# Patient Record
Sex: MALE | Race: WHITE | NOT HISPANIC OR LATINO | Employment: OTHER | URBAN - METROPOLITAN AREA
[De-identification: names, ages, dates, MRNs, and addresses within clinical notes are randomized per-mention and may not be internally consistent; named-entity substitution may affect disease eponyms.]

---

## 2019-01-01 ENCOUNTER — HOSPITAL ENCOUNTER (EMERGENCY)
Facility: HOSPITAL | Age: 84
Discharge: HOME | End: 2019-03-08
Attending: EMERGENCY MEDICINE
Payer: MEDICARE

## 2019-01-01 ENCOUNTER — HOSPITAL ENCOUNTER (INPATIENT)
Facility: HOSPITAL | Age: 84
LOS: 3 days | Discharge: NURSING, REHAB OR LONG-TERM CARE | DRG: 292 | End: 2019-03-27
Attending: FAMILY MEDICINE | Admitting: INTERNAL MEDICINE
Payer: MEDICARE

## 2019-01-01 ENCOUNTER — HOSPITAL ENCOUNTER (INPATIENT)
Facility: HOSPITAL | Age: 84
LOS: 2 days | Discharge: HOSPICE | DRG: 871 | End: 2019-06-25
Attending: INTERNAL MEDICINE | Admitting: INTERNAL MEDICINE
Payer: MEDICARE

## 2019-01-01 ENCOUNTER — HOSPITAL ENCOUNTER (INPATIENT)
Facility: HOSPITAL | Age: 84
LOS: 1 days | DRG: 951 | End: 2019-06-26
Attending: INTERNAL MEDICINE | Admitting: INTERNAL MEDICINE
Payer: COMMERCIAL

## 2019-01-01 VITALS
OXYGEN SATURATION: 76 % | HEART RATE: 70 BPM | TEMPERATURE: 95 F | WEIGHT: 115.75 LBS | HEIGHT: 72 IN | SYSTOLIC BLOOD PRESSURE: 63 MMHG | BODY MASS INDEX: 15.68 KG/M2 | RESPIRATION RATE: 7 BRPM | DIASTOLIC BLOOD PRESSURE: 46 MMHG

## 2019-01-01 VITALS
DIASTOLIC BLOOD PRESSURE: 65 MMHG | WEIGHT: 141.31 LBS | HEART RATE: 78 BPM | RESPIRATION RATE: 19 BRPM | BODY MASS INDEX: 19.78 KG/M2 | TEMPERATURE: 97 F | SYSTOLIC BLOOD PRESSURE: 113 MMHG | HEIGHT: 71 IN | OXYGEN SATURATION: 98 %

## 2019-01-01 VITALS
WEIGHT: 180 LBS | OXYGEN SATURATION: 99 % | RESPIRATION RATE: 20 BRPM | DIASTOLIC BLOOD PRESSURE: 68 MMHG | SYSTOLIC BLOOD PRESSURE: 118 MMHG | TEMPERATURE: 98 F | HEART RATE: 70 BPM

## 2019-01-01 VITALS
WEIGHT: 115.06 LBS | SYSTOLIC BLOOD PRESSURE: 97 MMHG | OXYGEN SATURATION: 100 % | HEIGHT: 72 IN | DIASTOLIC BLOOD PRESSURE: 60 MMHG | HEART RATE: 80 BPM | RESPIRATION RATE: 16 BRPM | TEMPERATURE: 98 F | BODY MASS INDEX: 15.58 KG/M2

## 2019-01-01 DIAGNOSIS — Z51.5 HOSPICE CARE: ICD-10-CM

## 2019-01-01 DIAGNOSIS — R09.89 RESPIRATORY COMPROMISE: ICD-10-CM

## 2019-01-01 DIAGNOSIS — J90 LOCULATED PLEURAL EFFUSION: ICD-10-CM

## 2019-01-01 DIAGNOSIS — E43 SEVERE MALNUTRITION: ICD-10-CM

## 2019-01-01 DIAGNOSIS — Z79.01 CHRONIC ANTICOAGULATION: ICD-10-CM

## 2019-01-01 DIAGNOSIS — I48.20 CHRONIC A-FIB: ICD-10-CM

## 2019-01-01 DIAGNOSIS — I50.9 CHF (CONGESTIVE HEART FAILURE): ICD-10-CM

## 2019-01-01 DIAGNOSIS — E86.0 MILD DEHYDRATION: Primary | ICD-10-CM

## 2019-01-01 DIAGNOSIS — I50.21 ACUTE SYSTOLIC CONGESTIVE HEART FAILURE: ICD-10-CM

## 2019-01-01 DIAGNOSIS — A41.9 SEPSIS: ICD-10-CM

## 2019-01-01 DIAGNOSIS — S09.8XXA BLUNT HEAD TRAUMA, INITIAL ENCOUNTER: Primary | ICD-10-CM

## 2019-01-01 DIAGNOSIS — S51.811A SKIN TEAR OF RIGHT FOREARM WITHOUT COMPLICATION, INITIAL ENCOUNTER: ICD-10-CM

## 2019-01-01 DIAGNOSIS — R64 CACHEXIA: ICD-10-CM

## 2019-01-01 DIAGNOSIS — J18.9 PNEUMONIA OF LEFT LUNG DUE TO INFECTIOUS ORGANISM, UNSPECIFIED PART OF LUNG: ICD-10-CM

## 2019-01-01 DIAGNOSIS — I95.0 IDIOPATHIC HYPOTENSION: Primary | ICD-10-CM

## 2019-01-01 DIAGNOSIS — R53.1 WEAKNESS: ICD-10-CM

## 2019-01-01 DIAGNOSIS — J90 PLEURAL EFFUSION: ICD-10-CM

## 2019-01-01 DIAGNOSIS — I50.9 ACUTE CONGESTIVE HEART FAILURE: ICD-10-CM

## 2019-01-01 LAB
ALBUMIN SERPL BCP-MCNC: 2.4 G/DL
ALBUMIN SERPL BCP-MCNC: 2.4 G/DL (ref 3.5–5.2)
ALBUMIN SERPL BCP-MCNC: 2.5 G/DL (ref 3.5–5.2)
ALBUMIN SERPL BCP-MCNC: 2.7 G/DL
ALBUMIN SERPL BCP-MCNC: 2.7 G/DL (ref 3.5–5.2)
ALLENS TEST: ABNORMAL
ALP SERPL-CCNC: 74 U/L (ref 55–135)
ALP SERPL-CCNC: 80 U/L (ref 55–135)
ALP SERPL-CCNC: 89 U/L
ALP SERPL-CCNC: 89 U/L (ref 55–135)
ALP SERPL-CCNC: 90 U/L
ALT SERPL W/O P-5'-P-CCNC: 22 U/L (ref 10–44)
ALT SERPL W/O P-5'-P-CCNC: 53 U/L (ref 10–44)
ALT SERPL W/O P-5'-P-CCNC: 8 U/L
ALT SERPL W/O P-5'-P-CCNC: 8 U/L
ALT SERPL W/O P-5'-P-CCNC: 9 U/L (ref 10–44)
AMORPH CRY URNS QL MICRO: ABNORMAL
ANION GAP SERPL CALC-SCNC: 10 MMOL/L (ref 8–16)
ANION GAP SERPL CALC-SCNC: 10 MMOL/L (ref 8–16)
ANION GAP SERPL CALC-SCNC: 11 MMOL/L (ref 8–16)
ANION GAP SERPL CALC-SCNC: 12 MMOL/L (ref 8–16)
ANION GAP SERPL CALC-SCNC: 14 MMOL/L (ref 8–16)
ANION GAP SERPL CALC-SCNC: 6 MMOL/L
ANION GAP SERPL CALC-SCNC: 6 MMOL/L (ref 8–16)
ANION GAP SERPL CALC-SCNC: 7 MMOL/L
ANION GAP SERPL CALC-SCNC: 7 MMOL/L (ref 8–16)
ANION GAP SERPL CALC-SCNC: 9 MMOL/L
ANION GAP SERPL CALC-SCNC: 9 MMOL/L (ref 8–16)
ANISOCYTOSIS BLD QL SMEAR: ABNORMAL
AORTIC VALVE CUSP SEPERATION: 2 CM
AORTIC VALVE CUSP SEPERATION: 2 CM
AST SERPL-CCNC: 153 U/L (ref 10–40)
AST SERPL-CCNC: 16 U/L (ref 10–40)
AST SERPL-CCNC: 17 U/L
AST SERPL-CCNC: 18 U/L
AST SERPL-CCNC: 42 U/L (ref 10–40)
AV PEAK GRADIENT: 2.56 MMHG
AV PEAK GRADIENT: 6 MMHG
AV VELOCITY RATIO: 0.63
AV VELOCITY RATIO: 0.67
BACTERIA #/AREA URNS HPF: ABNORMAL /HPF
BACTERIA #/AREA URNS HPF: ABNORMAL /HPF
BACTERIA UR CULT: NORMAL
BASOPHILS # BLD AUTO: 0 K/UL (ref 0–0.2)
BASOPHILS # BLD AUTO: 0.01 K/UL (ref 0–0.2)
BASOPHILS # BLD AUTO: 0.02 K/UL (ref 0–0.2)
BASOPHILS # BLD AUTO: 0.03 K/UL (ref 0–0.2)
BASOPHILS # BLD AUTO: 0.03 K/UL (ref 0–0.2)
BASOPHILS # BLD AUTO: 0.04 K/UL
BASOPHILS # BLD AUTO: 0.04 K/UL
BASOPHILS # BLD AUTO: 0.04 K/UL (ref 0–0.2)
BASOPHILS # BLD AUTO: 0.05 K/UL
BASOPHILS # BLD AUTO: 0.05 K/UL (ref 0–0.2)
BASOPHILS # BLD AUTO: 0.06 K/UL (ref 0–0.2)
BASOPHILS NFR BLD: 0 % (ref 0–1.9)
BASOPHILS NFR BLD: 0.1 % (ref 0–1.9)
BASOPHILS NFR BLD: 0.2 % (ref 0–1.9)
BASOPHILS NFR BLD: 0.2 % (ref 0–1.9)
BASOPHILS NFR BLD: 0.4 % (ref 0–1.9)
BASOPHILS NFR BLD: 0.5 %
BASOPHILS NFR BLD: 0.5 % (ref 0–1.9)
BASOPHILS NFR BLD: 0.6 %
BASOPHILS NFR BLD: 0.7 %
BASOPHILS NFR BLD: 0.7 % (ref 0–1.9)
BASOPHILS NFR BLD: 0.8 % (ref 0–1.9)
BILIRUB SERPL-MCNC: 1.1 MG/DL
BILIRUB SERPL-MCNC: 1.1 MG/DL (ref 0.1–1)
BILIRUB SERPL-MCNC: 1.4 MG/DL (ref 0.1–1)
BILIRUB SERPL-MCNC: 1.6 MG/DL (ref 0.1–1)
BILIRUB SERPL-MCNC: 2 MG/DL
BILIRUB UR QL STRIP: ABNORMAL
BILIRUB UR QL STRIP: ABNORMAL
BNP SERPL-MCNC: 1043 PG/ML
BNP SERPL-MCNC: 1228 PG/ML (ref 0–99)
BNP SERPL-MCNC: 1236 PG/ML (ref 0–99)
BNP SERPL-MCNC: 1369 PG/ML (ref 0–99)
BNP SERPL-MCNC: 834 PG/ML (ref 0–99)
BSA FOR ECHO PROCEDURE: 1.83 M2
BUN SERPL-MCNC: 19 MG/DL
BUN SERPL-MCNC: 19 MG/DL (ref 8–23)
BUN SERPL-MCNC: 19 MG/DL (ref 8–23)
BUN SERPL-MCNC: 22 MG/DL
BUN SERPL-MCNC: 22 MG/DL
BUN SERPL-MCNC: 23 MG/DL (ref 8–23)
BUN SERPL-MCNC: 23 MG/DL (ref 8–23)
BUN SERPL-MCNC: 45 MG/DL (ref 8–23)
BUN SERPL-MCNC: 52 MG/DL (ref 8–23)
BUN SERPL-MCNC: 62 MG/DL (ref 8–23)
BUN SERPL-MCNC: 63 MG/DL (ref 8–23)
BURR CELLS BLD QL SMEAR: ABNORMAL
CALCIUM SERPL-MCNC: 7.8 MG/DL (ref 8.7–10.5)
CALCIUM SERPL-MCNC: 7.8 MG/DL (ref 8.7–10.5)
CALCIUM SERPL-MCNC: 8 MG/DL (ref 8.7–10.5)
CALCIUM SERPL-MCNC: 8.1 MG/DL
CALCIUM SERPL-MCNC: 8.2 MG/DL
CALCIUM SERPL-MCNC: 8.3 MG/DL (ref 8.7–10.5)
CALCIUM SERPL-MCNC: 8.3 MG/DL (ref 8.7–10.5)
CALCIUM SERPL-MCNC: 8.4 MG/DL (ref 8.7–10.5)
CALCIUM SERPL-MCNC: 8.4 MG/DL (ref 8.7–10.5)
CALCIUM SERPL-MCNC: 8.5 MG/DL
CALCIUM SERPL-MCNC: 9.4 MG/DL (ref 8.7–10.5)
CHLORIDE SERPL-SCNC: 101 MMOL/L
CHLORIDE SERPL-SCNC: 102 MMOL/L
CHLORIDE SERPL-SCNC: 105 MMOL/L (ref 95–110)
CHLORIDE SERPL-SCNC: 106 MMOL/L (ref 95–110)
CHLORIDE SERPL-SCNC: 107 MMOL/L (ref 95–110)
CHLORIDE SERPL-SCNC: 107 MMOL/L (ref 95–110)
CHLORIDE SERPL-SCNC: 93 MMOL/L (ref 95–110)
CHLORIDE SERPL-SCNC: 94 MMOL/L (ref 95–110)
CHLORIDE SERPL-SCNC: 95 MMOL/L
CHLORIDE SERPL-SCNC: 95 MMOL/L (ref 95–110)
CHLORIDE SERPL-SCNC: 97 MMOL/L (ref 95–110)
CLARITY UR: ABNORMAL
CLARITY UR: CLEAR
CO2 SERPL-SCNC: 18 MMOL/L (ref 23–29)
CO2 SERPL-SCNC: 21 MMOL/L (ref 23–29)
CO2 SERPL-SCNC: 21 MMOL/L (ref 23–29)
CO2 SERPL-SCNC: 22 MMOL/L (ref 23–29)
CO2 SERPL-SCNC: 29 MMOL/L
CO2 SERPL-SCNC: 29 MMOL/L (ref 23–29)
CO2 SERPL-SCNC: 30 MMOL/L (ref 23–29)
COLOR UR: YELLOW
COLOR UR: YELLOW
CPAPPEEP: 5
CPAPPEEP: ABNORMAL
CREAT SERPL-MCNC: 0.8 MG/DL
CREAT SERPL-MCNC: 0.8 MG/DL
CREAT SERPL-MCNC: 0.8 MG/DL (ref 0.5–1.4)
CREAT SERPL-MCNC: 0.9 MG/DL
CREAT SERPL-MCNC: 0.9 MG/DL (ref 0.5–1.4)
CREAT SERPL-MCNC: 1 MG/DL (ref 0.5–1.4)
CREAT SERPL-MCNC: 1 MG/DL (ref 0.5–1.4)
CREAT SERPL-MCNC: 1.9 MG/DL (ref 0.5–1.4)
CREAT SERPL-MCNC: 2.3 MG/DL (ref 0.5–1.4)
CREAT SERPL-MCNC: 2.6 MG/DL (ref 0.5–1.4)
CREAT SERPL-MCNC: 2.7 MG/DL (ref 0.5–1.4)
CV ECHO LV RWT: 0.55 CM
CV ECHO LV RWT: 0.64 CM
DIFFERENTIAL METHOD: ABNORMAL
DOP CALC AO PEAK VEL: 0.8 M/S
DOP CALC AO PEAK VEL: 1.2 M/S
DOP CALC LVOT AREA: 3.14 CM2
DOP CALC LVOT AREA: 3.5 CM2
DOP CALC LVOT DIAMETER: 2 CM
DOP CALC LVOT DIAMETER: 2.1 CM
DOP CALC LVOT PEAK VEL: 0.5 M/S
DOP CALC LVOT PEAK VEL: 0.8 M/S
E/A RATIO: 3.11
E/A RATIO: 3.48
ECHO LV POSTERIOR WALL: 1.2 CM (ref 0.6–1.1)
ECHO LV POSTERIOR WALL: 1.5 CM (ref 0.6–1.1)
EOSINOPHIL # BLD AUTO: 0 K/UL (ref 0–0.5)
EOSINOPHIL # BLD AUTO: 0.1 K/UL (ref 0–0.5)
EOSINOPHIL # BLD AUTO: 0.2 K/UL (ref 0–0.5)
EOSINOPHIL # BLD AUTO: 0.3 K/UL
EOSINOPHIL # BLD AUTO: 0.3 K/UL (ref 0–0.5)
EOSINOPHIL # BLD AUTO: 0.4 K/UL (ref 0–0.5)
EOSINOPHIL NFR BLD: 0 % (ref 0–8)
EOSINOPHIL NFR BLD: 0.1 % (ref 0–8)
EOSINOPHIL NFR BLD: 1.9 % (ref 0–8)
EOSINOPHIL NFR BLD: 3.5 % (ref 0–8)
EOSINOPHIL NFR BLD: 3.9 %
EOSINOPHIL NFR BLD: 3.9 % (ref 0–8)
EOSINOPHIL NFR BLD: 4 %
EOSINOPHIL NFR BLD: 4.2 %
EOSINOPHIL NFR BLD: 4.5 % (ref 0–8)
ERYTHROCYTE [DISTWIDTH] IN BLOOD BY AUTOMATED COUNT: 19.3 % (ref 11.5–14.5)
ERYTHROCYTE [DISTWIDTH] IN BLOOD BY AUTOMATED COUNT: 19.4 %
ERYTHROCYTE [DISTWIDTH] IN BLOOD BY AUTOMATED COUNT: 19.4 % (ref 11.5–14.5)
ERYTHROCYTE [DISTWIDTH] IN BLOOD BY AUTOMATED COUNT: 19.6 %
ERYTHROCYTE [DISTWIDTH] IN BLOOD BY AUTOMATED COUNT: 19.8 %
ERYTHROCYTE [DISTWIDTH] IN BLOOD BY AUTOMATED COUNT: 19.9 % (ref 11.5–14.5)
ERYTHROCYTE [DISTWIDTH] IN BLOOD BY AUTOMATED COUNT: 20 % (ref 11.5–14.5)
ERYTHROCYTE [DISTWIDTH] IN BLOOD BY AUTOMATED COUNT: 20.9 % (ref 11.5–14.5)
ERYTHROCYTE [DISTWIDTH] IN BLOOD BY AUTOMATED COUNT: 21.1 % (ref 11.5–14.5)
ERYTHROCYTE [DISTWIDTH] IN BLOOD BY AUTOMATED COUNT: 21.3 % (ref 11.5–14.5)
ERYTHROCYTE [DISTWIDTH] IN BLOOD BY AUTOMATED COUNT: 21.5 % (ref 11.5–14.5)
ERYTHROCYTE [SEDIMENTATION RATE] IN BLOOD BY WESTERGREN METHOD: 12 MM/H
ERYTHROCYTE [SEDIMENTATION RATE] IN BLOOD BY WESTERGREN METHOD: 12 MM/H
ERYTHROCYTE [SEDIMENTATION RATE] IN BLOOD BY WESTERGREN METHOD: 16 MM/H
ERYTHROCYTE [SEDIMENTATION RATE] IN BLOOD BY WESTERGREN METHOD: 16 MM/H
EST. GFR  (AFRICAN AMERICAN): 23.6 ML/MIN/1.73 M^2
EST. GFR  (AFRICAN AMERICAN): 24.7 ML/MIN/1.73 M^2
EST. GFR  (AFRICAN AMERICAN): 28.7 ML/MIN/1.73 M^2
EST. GFR  (AFRICAN AMERICAN): 36.1 ML/MIN/1.73 M^2
EST. GFR  (AFRICAN AMERICAN): >60 ML/MIN/1.73 M^2
EST. GFR  (NON AFRICAN AMERICAN): 20.4 ML/MIN/1.73 M^2
EST. GFR  (NON AFRICAN AMERICAN): 21.4 ML/MIN/1.73 M^2
EST. GFR  (NON AFRICAN AMERICAN): 24.8 ML/MIN/1.73 M^2
EST. GFR  (NON AFRICAN AMERICAN): 31.2 ML/MIN/1.73 M^2
EST. GFR  (NON AFRICAN AMERICAN): >60 ML/MIN/1.73 M^2
FRACTIONAL SHORTENING: 17 % (ref 28–44)
FRACTIONAL SHORTENING: 18 % (ref 28–44)
GLUCOSE SERPL-MCNC: 106 MG/DL (ref 70–110)
GLUCOSE SERPL-MCNC: 113 MG/DL
GLUCOSE SERPL-MCNC: 146 MG/DL (ref 70–110)
GLUCOSE SERPL-MCNC: 163 MG/DL
GLUCOSE SERPL-MCNC: 188 MG/DL (ref 70–110)
GLUCOSE SERPL-MCNC: 65 MG/DL (ref 70–110)
GLUCOSE SERPL-MCNC: 87 MG/DL
GLUCOSE SERPL-MCNC: 90 MG/DL (ref 70–110)
GLUCOSE SERPL-MCNC: 90 MG/DL (ref 70–110)
GLUCOSE SERPL-MCNC: 94 MG/DL (ref 70–110)
GLUCOSE SERPL-MCNC: 95 MG/DL (ref 70–110)
GLUCOSE UR QL STRIP: NEGATIVE
GLUCOSE UR QL STRIP: NEGATIVE
HCO3 UR-SCNC: 17.4 MMOL/L (ref 22–26)
HCO3 UR-SCNC: 19 MMOL/L (ref 22–26)
HCO3 UR-SCNC: 19.3 MMOL/L (ref 22–26)
HCO3 UR-SCNC: 19.4 MMOL/L (ref 22–26)
HCT VFR BLD AUTO: 36.1 % (ref 40–54)
HCT VFR BLD AUTO: 37.6 % (ref 40–54)
HCT VFR BLD AUTO: 38.1 % (ref 40–54)
HCT VFR BLD AUTO: 38.3 % (ref 40–54)
HCT VFR BLD AUTO: 38.4 % (ref 40–54)
HCT VFR BLD AUTO: 38.7 %
HCT VFR BLD AUTO: 38.8 % (ref 40–54)
HCT VFR BLD AUTO: 39.5 %
HCT VFR BLD AUTO: 39.8 % (ref 40–54)
HCT VFR BLD AUTO: 41.9 % (ref 40–54)
HCT VFR BLD AUTO: 44.6 %
HGB BLD-MCNC: 11.3 G/DL (ref 14–18)
HGB BLD-MCNC: 11.8 G/DL (ref 14–18)
HGB BLD-MCNC: 11.8 G/DL (ref 14–18)
HGB BLD-MCNC: 11.9 G/DL (ref 14–18)
HGB BLD-MCNC: 12 G/DL
HGB BLD-MCNC: 12.1 G/DL
HGB BLD-MCNC: 12.1 G/DL (ref 14–18)
HGB BLD-MCNC: 12.1 G/DL (ref 14–18)
HGB BLD-MCNC: 12.2 G/DL (ref 14–18)
HGB BLD-MCNC: 13.1 G/DL (ref 14–18)
HGB BLD-MCNC: 13.8 G/DL
HGB UR QL STRIP: NEGATIVE
HGB UR QL STRIP: NEGATIVE
HYALINE CASTS #/AREA URNS LPF: 2 /LPF
HYALINE CASTS #/AREA URNS LPF: 75 /LPF
IMM GRANULOCYTES # BLD AUTO: 0.02 K/UL
IMM GRANULOCYTES # BLD AUTO: 0.02 K/UL
IMM GRANULOCYTES # BLD AUTO: 0.02 K/UL (ref 0–0.04)
IMM GRANULOCYTES # BLD AUTO: 0.03 K/UL
IMM GRANULOCYTES # BLD AUTO: 0.05 K/UL (ref 0–0.04)
IMM GRANULOCYTES # BLD AUTO: 0.08 K/UL (ref 0–0.04)
IMM GRANULOCYTES # BLD AUTO: 0.09 K/UL (ref 0–0.04)
IMM GRANULOCYTES # BLD AUTO: 0.1 K/UL (ref 0–0.04)
IMM GRANULOCYTES NFR BLD AUTO: 0.2 % (ref 0–0.5)
IMM GRANULOCYTES NFR BLD AUTO: 0.3 %
IMM GRANULOCYTES NFR BLD AUTO: 0.3 %
IMM GRANULOCYTES NFR BLD AUTO: 0.3 % (ref 0–0.5)
IMM GRANULOCYTES NFR BLD AUTO: 0.5 %
IMM GRANULOCYTES NFR BLD AUTO: 0.6 % (ref 0–0.5)
IMM GRANULOCYTES NFR BLD AUTO: 0.7 % (ref 0–0.5)
IMM GRANULOCYTES NFR BLD AUTO: 0.7 % (ref 0–0.5)
IMM GRANULOCYTES NFR BLD AUTO: 0.8 % (ref 0–0.5)
INTERVENTRICULAR SEPTUM: 1.9 CM (ref 0.6–1.1)
INTERVENTRICULAR SEPTUM: 2 CM (ref 0.6–1.1)
KETONES UR QL STRIP: NEGATIVE
KETONES UR QL STRIP: NEGATIVE
LACTATE SERPL-SCNC: 2.3 MMOL/L (ref 0.5–2.2)
LEFT ATRIUM SIZE: 4.1 CM
LEFT ATRIUM SIZE: 4.8 CM
LEFT INTERNAL DIMENSION IN SYSTOLE: 3.6 CM (ref 2.1–4)
LEFT INTERNAL DIMENSION IN SYSTOLE: 3.9 CM (ref 2.1–4)
LEFT VENTRICLE MASS INDEX: 167 G/M2
LEFT VENTRICLE MASS INDEX: 201 G/M2
LEFT VENTRICULAR INTERNAL DIMENSION IN DIASTOLE: 4.4 CM (ref 3.5–6)
LEFT VENTRICULAR INTERNAL DIMENSION IN DIASTOLE: 4.7 CM (ref 3.5–6)
LEFT VENTRICULAR MASS: 280.73 G
LEFT VENTRICULAR MASS: 372.96 G
LEUKOCYTE ESTERASE UR QL STRIP: ABNORMAL
LEUKOCYTE ESTERASE UR QL STRIP: NEGATIVE
LYMPHOCYTES # BLD AUTO: 0.5 K/UL (ref 1–4.8)
LYMPHOCYTES # BLD AUTO: 0.7 K/UL (ref 1–4.8)
LYMPHOCYTES # BLD AUTO: 1.1 K/UL
LYMPHOCYTES # BLD AUTO: 1.2 K/UL
LYMPHOCYTES # BLD AUTO: 1.4 K/UL (ref 1–4.8)
LYMPHOCYTES # BLD AUTO: 1.4 K/UL (ref 1–4.8)
LYMPHOCYTES # BLD AUTO: 1.5 K/UL (ref 1–4.8)
LYMPHOCYTES # BLD AUTO: 1.6 K/UL (ref 1–4.8)
LYMPHOCYTES # BLD AUTO: 1.7 K/UL
LYMPHOCYTES NFR BLD: 17 %
LYMPHOCYTES NFR BLD: 17.1 %
LYMPHOCYTES NFR BLD: 17.2 % (ref 18–48)
LYMPHOCYTES NFR BLD: 20 % (ref 18–48)
LYMPHOCYTES NFR BLD: 20.1 % (ref 18–48)
LYMPHOCYTES NFR BLD: 20.4 % (ref 18–48)
LYMPHOCYTES NFR BLD: 20.8 %
LYMPHOCYTES NFR BLD: 3.9 % (ref 18–48)
LYMPHOCYTES NFR BLD: 5.5 % (ref 18–48)
LYMPHOCYTES NFR BLD: 6 % (ref 18–48)
LYMPHOCYTES NFR BLD: 8.2 % (ref 18–48)
MAGNESIUM SERPL-MCNC: 1.9 MG/DL
MAGNESIUM SERPL-MCNC: 1.9 MG/DL (ref 1.6–2.6)
MAGNESIUM SERPL-MCNC: 2 MG/DL (ref 1.6–2.6)
MCH RBC QN AUTO: 24.3 PG (ref 27–31)
MCH RBC QN AUTO: 24.5 PG (ref 27–31)
MCH RBC QN AUTO: 24.6 PG
MCH RBC QN AUTO: 24.6 PG (ref 27–31)
MCH RBC QN AUTO: 24.7 PG
MCH RBC QN AUTO: 24.7 PG
MCH RBC QN AUTO: 24.7 PG (ref 27–31)
MCH RBC QN AUTO: 25.6 PG (ref 27–31)
MCH RBC QN AUTO: 25.6 PG (ref 27–31)
MCH RBC QN AUTO: 25.7 PG (ref 27–31)
MCH RBC QN AUTO: 26 PG (ref 27–31)
MCHC RBC AUTO-ENTMCNC: 30.6 G/DL
MCHC RBC AUTO-ENTMCNC: 30.7 G/DL (ref 32–36)
MCHC RBC AUTO-ENTMCNC: 30.8 G/DL (ref 32–36)
MCHC RBC AUTO-ENTMCNC: 30.9 G/DL
MCHC RBC AUTO-ENTMCNC: 31 G/DL
MCHC RBC AUTO-ENTMCNC: 31.2 G/DL (ref 32–36)
MCHC RBC AUTO-ENTMCNC: 31.2 G/DL (ref 32–36)
MCHC RBC AUTO-ENTMCNC: 31.3 G/DL (ref 32–36)
MCHC RBC AUTO-ENTMCNC: 31.3 G/DL (ref 32–36)
MCHC RBC AUTO-ENTMCNC: 31.4 G/DL (ref 32–36)
MCHC RBC AUTO-ENTMCNC: 31.5 G/DL (ref 32–36)
MCV RBC AUTO: 78 FL (ref 82–98)
MCV RBC AUTO: 79 FL
MCV RBC AUTO: 79 FL (ref 82–98)
MCV RBC AUTO: 80 FL
MCV RBC AUTO: 81 FL
MCV RBC AUTO: 82 FL (ref 82–98)
MCV RBC AUTO: 83 FL (ref 82–98)
MICROSCOPIC COMMENT: ABNORMAL
MICROSCOPIC COMMENT: ABNORMAL
MODE: ABNORMAL
MONOCYTES # BLD AUTO: 0.4 K/UL (ref 0.3–1)
MONOCYTES # BLD AUTO: 0.4 K/UL (ref 0.3–1)
MONOCYTES # BLD AUTO: 0.5 K/UL (ref 0.3–1)
MONOCYTES # BLD AUTO: 0.6 K/UL (ref 0.3–1)
MONOCYTES # BLD AUTO: 0.8 K/UL
MONOCYTES # BLD AUTO: 0.8 K/UL
MONOCYTES # BLD AUTO: 0.8 K/UL (ref 0.3–1)
MONOCYTES # BLD AUTO: 0.9 K/UL (ref 0.3–1)
MONOCYTES # BLD AUTO: 1 K/UL
MONOCYTES # BLD AUTO: 1 K/UL (ref 0.3–1)
MONOCYTES # BLD AUTO: 1 K/UL (ref 0.3–1)
MONOCYTES NFR BLD: 10.3 %
MONOCYTES NFR BLD: 12 % (ref 4–15)
MONOCYTES NFR BLD: 12 % (ref 4–15)
MONOCYTES NFR BLD: 12.2 % (ref 4–15)
MONOCYTES NFR BLD: 12.3 %
MONOCYTES NFR BLD: 12.9 %
MONOCYTES NFR BLD: 13 % (ref 4–15)
MONOCYTES NFR BLD: 3.2 % (ref 4–15)
MONOCYTES NFR BLD: 3.3 % (ref 4–15)
MONOCYTES NFR BLD: 4 % (ref 4–15)
MONOCYTES NFR BLD: 6.6 % (ref 4–15)
MV PEAK A VEL: 0.23 M/S
MV PEAK A VEL: 0.28 M/S
MV PEAK E VEL: 0.8 M/S
MV PEAK E VEL: 0.87 M/S
MV PEAK GRADIENT: 53 MMHG
MV PEAK GRADIENT: 82 MMHG
MV STENOSIS PRESSURE HALF TIME: 40 MS
MV STENOSIS PRESSURE HALF TIME: 57 MS
MV VALVE AREA P 1/2 METHOD: 3.86 CM2
MV VALVE AREA P 1/2 METHOD: 5.5 CM2
NEUTROPHILS # BLD AUTO: 10.4 K/UL (ref 1.8–7.7)
NEUTROPHILS # BLD AUTO: 10.9 K/UL (ref 1.8–7.7)
NEUTROPHILS # BLD AUTO: 11.9 K/UL (ref 1.8–7.7)
NEUTROPHILS # BLD AUTO: 4.3 K/UL
NEUTROPHILS # BLD AUTO: 4.4 K/UL (ref 1.8–7.7)
NEUTROPHILS # BLD AUTO: 4.9 K/UL
NEUTROPHILS # BLD AUTO: 4.9 K/UL
NEUTROPHILS # BLD AUTO: 4.9 K/UL (ref 1.8–7.7)
NEUTROPHILS # BLD AUTO: 4.9 K/UL (ref 1.8–7.7)
NEUTROPHILS # BLD AUTO: 5.3 K/UL (ref 1.8–7.7)
NEUTROPHILS # BLD AUTO: 7.2 K/UL (ref 1.8–7.7)
NEUTROPHILS NFR BLD: 61.3 %
NEUTROPHILS NFR BLD: 61.6 % (ref 38–73)
NEUTROPHILS NFR BLD: 63.7 % (ref 38–73)
NEUTROPHILS NFR BLD: 64.9 % (ref 38–73)
NEUTROPHILS NFR BLD: 65.5 %
NEUTROPHILS NFR BLD: 65.6 % (ref 38–73)
NEUTROPHILS NFR BLD: 67.8 %
NEUTROPHILS NFR BLD: 84.3 % (ref 38–73)
NEUTROPHILS NFR BLD: 89.9 % (ref 38–73)
NEUTROPHILS NFR BLD: 90.3 % (ref 38–73)
NEUTROPHILS NFR BLD: 91.4 % (ref 38–73)
NITRITE UR QL STRIP: NEGATIVE
NITRITE UR QL STRIP: NEGATIVE
NRBC BLD-RTO: 0 /100 WBC
NRBC BLD-RTO: 1 /100 WBC
O2DEVICE: ABNORMAL
PCO2 BLDA: 34.5 MMHG (ref 35–45)
PCO2 BLDA: 38.4 MMHG (ref 35–45)
PCO2 BLDA: 39.5 MMHG (ref 35–45)
PCO2 BLDA: 42.2 MMHG (ref 35–45)
PH SMN: 7.28 [PH] (ref 7.35–7.45)
PH SMN: 7.3 [PH] (ref 7.35–7.45)
PH SMN: 7.32 [PH] (ref 7.35–7.45)
PH SMN: 7.32 [PH] (ref 7.35–7.45)
PH UR STRIP: 5 [PH] (ref 5–8)
PH UR STRIP: 5 [PH] (ref 5–8)
PHOSPHATE SERPL-MCNC: 4.6 MG/DL (ref 2.7–4.5)
PIP: ABNORMAL
PISA TR MAX VEL: 1.94 M/S
PISA TR MAX VEL: 2.65 M/S
PLATELET # BLD AUTO: 236 K/UL (ref 150–350)
PLATELET # BLD AUTO: 239 K/UL (ref 150–350)
PLATELET # BLD AUTO: 241 K/UL (ref 150–350)
PLATELET # BLD AUTO: 256 K/UL (ref 150–350)
PLATELET # BLD AUTO: 257 K/UL
PLATELET # BLD AUTO: 257 K/UL (ref 150–350)
PLATELET # BLD AUTO: 263 K/UL
PLATELET # BLD AUTO: 264 K/UL
PLATELET # BLD AUTO: 264 K/UL (ref 150–350)
PLATELET # BLD AUTO: 271 K/UL (ref 150–350)
PLATELET # BLD AUTO: 279 K/UL (ref 150–350)
PMV BLD AUTO: 10 FL
PMV BLD AUTO: 10 FL (ref 9.2–12.9)
PMV BLD AUTO: 9.3 FL
PMV BLD AUTO: 9.3 FL (ref 9.2–12.9)
PMV BLD AUTO: 9.4 FL (ref 9.2–12.9)
PMV BLD AUTO: 9.8 FL
PMV BLD AUTO: 9.8 FL (ref 9.2–12.9)
PMV BLD AUTO: 9.9 FL (ref 9.2–12.9)
PO2 BLDA: 125.6 MMHG (ref 75–100)
PO2 BLDA: 126.9 MMHG (ref 75–100)
PO2 BLDA: 386.5 MMHG (ref 75–100)
PO2 BLDA: 88 MMHG (ref 75–100)
POC BE: -6.3 MMOL/L (ref -2–2)
POC BE: -6.8 MMOL/L (ref -2–2)
POC BE: -7 MMOL/L (ref -2–2)
POC BE: -7.7 MMOL/L (ref -2–2)
POC FIO2: ABNORMAL
POC FLOW: ABNORMAL
POC O2 PERCENT: 50 %
POC O2 PERCENT: 50 %
POC O2 PERCENT: ABNORMAL %
POC O2 PERCENT: ABNORMAL %
POC SATURATED O2: 100 % (ref 90–100)
POC SATURATED O2: 95.9 % (ref 90–100)
POC SATURATED O2: 98.4 % (ref 90–100)
POC SATURATED O2: 98.4 % (ref 90–100)
POC TCO2: 18.5 MMOL/L (ref 22–28)
POC TCO2: 20.3 MMOL/L (ref 22–28)
POC TCO2: 20.4 MMOL/L (ref 22–28)
POC TCO2: 20.7 MMOL/L (ref 22–28)
POC TEMPERATURE: ABNORMAL C
POIKILOCYTOSIS BLD QL SMEAR: ABNORMAL
POLYCHROMASIA BLD QL SMEAR: ABNORMAL
POTASSIUM SERPL-SCNC: 3.4 MMOL/L
POTASSIUM SERPL-SCNC: 3.5 MMOL/L
POTASSIUM SERPL-SCNC: 3.7 MMOL/L
POTASSIUM SERPL-SCNC: 3.7 MMOL/L (ref 3.5–5.1)
POTASSIUM SERPL-SCNC: 3.8 MMOL/L (ref 3.5–5.1)
POTASSIUM SERPL-SCNC: 4 MMOL/L (ref 3.5–5.1)
POTASSIUM SERPL-SCNC: 4.1 MMOL/L (ref 3.5–5.1)
POTASSIUM SERPL-SCNC: 4.3 MMOL/L (ref 3.5–5.1)
POTASSIUM SERPL-SCNC: 4.3 MMOL/L (ref 3.5–5.1)
POTASSIUM SERPL-SCNC: 4.4 MMOL/L (ref 3.5–5.1)
POTASSIUM SERPL-SCNC: 4.6 MMOL/L (ref 3.5–5.1)
PRESSURE SUPPORT: 0
PRESSURE SUPPORT: 0
PRESSURE SUPPORT: ABNORMAL
PRESSURE SUPPORT: ABNORMAL
PROT SERPL-MCNC: 5.8 G/DL (ref 6–8.4)
PROT SERPL-MCNC: 5.9 G/DL (ref 6–8.4)
PROT SERPL-MCNC: 5.9 G/DL (ref 6–8.4)
PROT SERPL-MCNC: 6 G/DL
PROT SERPL-MCNC: 6.3 G/DL
PROT UR QL STRIP: ABNORMAL
PROT UR QL STRIP: ABNORMAL
PV PEAK VELOCITY: 1.01 CM/S
RA PRESSURE: 3 MMHG
RA PRESSURE: 3 MMHG
RBC # BLD AUTO: 4.54 M/UL (ref 4.6–6.2)
RBC # BLD AUTO: 4.61 M/UL (ref 4.6–6.2)
RBC # BLD AUTO: 4.61 M/UL (ref 4.6–6.2)
RBC # BLD AUTO: 4.63 M/UL (ref 4.6–6.2)
RBC # BLD AUTO: 4.77 M/UL (ref 4.6–6.2)
RBC # BLD AUTO: 4.88 M/UL
RBC # BLD AUTO: 4.89 M/UL
RBC # BLD AUTO: 4.9 M/UL (ref 4.6–6.2)
RBC # BLD AUTO: 4.91 M/UL (ref 4.6–6.2)
RBC # BLD AUTO: 5.39 M/UL (ref 4.6–6.2)
RBC # BLD AUTO: 5.59 M/UL
RBC #/AREA URNS HPF: 0 /HPF (ref 0–4)
RBC #/AREA URNS HPF: 3 /HPF (ref 0–4)
RIGHT VENTRICULAR END-DIASTOLIC DIMENSION: 2.4 CM
RIGHT VENTRICULAR END-DIASTOLIC DIMENSION: 2.7 CM
SITE: ABNORMAL
SODIUM SERPL-SCNC: 131 MMOL/L (ref 136–145)
SODIUM SERPL-SCNC: 133 MMOL/L
SODIUM SERPL-SCNC: 133 MMOL/L (ref 136–145)
SODIUM SERPL-SCNC: 133 MMOL/L (ref 136–145)
SODIUM SERPL-SCNC: 134 MMOL/L (ref 136–145)
SODIUM SERPL-SCNC: 136 MMOL/L (ref 136–145)
SODIUM SERPL-SCNC: 137 MMOL/L
SODIUM SERPL-SCNC: 137 MMOL/L
SODIUM SERPL-SCNC: 138 MMOL/L (ref 136–145)
SODIUM SERPL-SCNC: 139 MMOL/L (ref 136–145)
SODIUM SERPL-SCNC: 140 MMOL/L (ref 136–145)
SP GR UR STRIP: 1.02 (ref 1–1.03)
SP GR UR STRIP: 1.02 (ref 1–1.03)
SQUAMOUS #/AREA URNS HPF: 3 /HPF
T4 FREE SERPL-MCNC: 0.96 NG/DL (ref 0.71–1.51)
TB INDURATION 48 - 72 HR READ: 0 0MM
TR MAX PG: 15.05 MMHG
TR MAX PG: 28 MMHG
TROPONIN I SERPL DL<=0.01 NG/ML-MCNC: 0.53 NG/ML (ref 0.02–0.5)
TROPONIN I SERPL DL<=0.01 NG/ML-MCNC: 0.56 NG/ML (ref 0.02–0.5)
TSH SERPL DL<=0.005 MIU/L-ACNC: 9.68 UIU/ML (ref 0.34–5.6)
TV REST PULMONARY ARTERY PRESSURE: 18 MMHG
TV REST PULMONARY ARTERY PRESSURE: 31 MMHG
URN SPEC COLLECT METH UR: ABNORMAL
URN SPEC COLLECT METH UR: ABNORMAL
UROBILINOGEN UR STRIP-ACNC: 1 EU/DL
UROBILINOGEN UR STRIP-ACNC: 4 EU/DL
VT: 450
WBC # BLD AUTO: 11.4 K/UL (ref 3.9–12.7)
WBC # BLD AUTO: 12.09 K/UL (ref 3.9–12.7)
WBC # BLD AUTO: 13.19 K/UL (ref 3.9–12.7)
WBC # BLD AUTO: 6.5 K/UL
WBC # BLD AUTO: 6.86 K/UL (ref 3.9–12.7)
WBC # BLD AUTO: 7.25 K/UL
WBC # BLD AUTO: 7.46 K/UL (ref 3.9–12.7)
WBC # BLD AUTO: 7.93 K/UL
WBC # BLD AUTO: 7.94 K/UL (ref 3.9–12.7)
WBC # BLD AUTO: 8.03 K/UL (ref 3.9–12.7)
WBC # BLD AUTO: 8.58 K/UL (ref 3.9–12.7)
WBC #/AREA URNS HPF: 0 /HPF (ref 0–5)
WBC #/AREA URNS HPF: 25 /HPF (ref 0–5)

## 2019-01-01 PROCEDURE — 63600175 PHARM REV CODE 636 W HCPCS: Performed by: INTERNAL MEDICINE

## 2019-01-01 PROCEDURE — 82803 BLOOD GASES ANY COMBINATION: CPT

## 2019-01-01 PROCEDURE — 71045 X-RAY EXAM CHEST 1 VIEW: CPT | Mod: TC,FY

## 2019-01-01 PROCEDURE — 21400001 HC TELEMETRY ROOM

## 2019-01-01 PROCEDURE — 84484 ASSAY OF TROPONIN QUANT: CPT

## 2019-01-01 PROCEDURE — 36415 COLL VENOUS BLD VENIPUNCTURE: CPT

## 2019-01-01 PROCEDURE — 36600 WITHDRAWAL OF ARTERIAL BLOOD: CPT

## 2019-01-01 PROCEDURE — 99222 1ST HOSP IP/OBS MODERATE 55: CPT | Mod: ,,, | Performed by: SURGERY

## 2019-01-01 PROCEDURE — 86580 TB INTRADERMAL TEST: CPT | Performed by: INTERNAL MEDICINE

## 2019-01-01 PROCEDURE — G8978 MOBILITY CURRENT STATUS: HCPCS | Mod: CM

## 2019-01-01 PROCEDURE — 94761 N-INVAS EAR/PLS OXIMETRY MLT: CPT

## 2019-01-01 PROCEDURE — 71045 XR CHEST AP PORTABLE: ICD-10-PCS | Mod: 26,,, | Performed by: RADIOLOGY

## 2019-01-01 PROCEDURE — 83735 ASSAY OF MAGNESIUM: CPT

## 2019-01-01 PROCEDURE — 85025 COMPLETE CBC W/AUTO DIFF WBC: CPT

## 2019-01-01 PROCEDURE — 71045 X-RAY EXAM CHEST 1 VIEW: CPT | Mod: 26,,, | Performed by: RADIOLOGY

## 2019-01-01 PROCEDURE — 81000 URINALYSIS NONAUTO W/SCOPE: CPT

## 2019-01-01 PROCEDURE — 80053 COMPREHEN METABOLIC PANEL: CPT

## 2019-01-01 PROCEDURE — 80048 BASIC METABOLIC PNL TOTAL CA: CPT

## 2019-01-01 PROCEDURE — 11000001 HC ACUTE MED/SURG PRIVATE ROOM

## 2019-01-01 PROCEDURE — 27000221 HC OXYGEN, UP TO 24 HOURS

## 2019-01-01 PROCEDURE — 25000003 PHARM REV CODE 250: Performed by: INTERNAL MEDICINE

## 2019-01-01 PROCEDURE — 94640 AIRWAY INHALATION TREATMENT: CPT

## 2019-01-01 PROCEDURE — 94003 VENT MGMT INPAT SUBQ DAY: CPT

## 2019-01-01 PROCEDURE — 94760 N-INVAS EAR/PLS OXIMETRY 1: CPT

## 2019-01-01 PROCEDURE — 63600175 PHARM REV CODE 636 W HCPCS

## 2019-01-01 PROCEDURE — 96361 HYDRATE IV INFUSION ADD-ON: CPT

## 2019-01-01 PROCEDURE — 97162 PT EVAL MOD COMPLEX 30 MIN: CPT

## 2019-01-01 PROCEDURE — 25000242 PHARM REV CODE 250 ALT 637 W/ HCPCS: Performed by: INTERNAL MEDICINE

## 2019-01-01 PROCEDURE — 70450 CT HEAD WITHOUT CONTRAST: ICD-10-PCS | Mod: 26,,, | Performed by: RADIOLOGY

## 2019-01-01 PROCEDURE — 87088 URINE BACTERIA CULTURE: CPT

## 2019-01-01 PROCEDURE — 25000003 PHARM REV CODE 250: Performed by: FAMILY MEDICINE

## 2019-01-01 PROCEDURE — 99222 PR INITIAL HOSPITAL CARE,LEVL II: ICD-10-PCS | Mod: ,,, | Performed by: SURGERY

## 2019-01-01 PROCEDURE — 96375 TX/PRO/DX INJ NEW DRUG ADDON: CPT

## 2019-01-01 PROCEDURE — C9113 INJ PANTOPRAZOLE SODIUM, VIA: HCPCS | Performed by: INTERNAL MEDICINE

## 2019-01-01 PROCEDURE — 99285 EMERGENCY DEPT VISIT HI MDM: CPT | Mod: 25

## 2019-01-01 PROCEDURE — 93005 ELECTROCARDIOGRAM TRACING: CPT

## 2019-01-01 PROCEDURE — G0378 HOSPITAL OBSERVATION PER HR: HCPCS

## 2019-01-01 PROCEDURE — 83880 ASSAY OF NATRIURETIC PEPTIDE: CPT

## 2019-01-01 PROCEDURE — 96376 TX/PRO/DX INJ SAME DRUG ADON: CPT

## 2019-01-01 PROCEDURE — 84100 ASSAY OF PHOSPHORUS: CPT

## 2019-01-01 PROCEDURE — S5010 5% DEXTROSE AND 0.45% SALINE: HCPCS | Performed by: FAMILY MEDICINE

## 2019-01-01 PROCEDURE — 96365 THER/PROPH/DIAG IV INF INIT: CPT

## 2019-01-01 PROCEDURE — 99900035 HC TECH TIME PER 15 MIN (STAT)

## 2019-01-01 PROCEDURE — 51702 INSERT TEMP BLADDER CATH: CPT

## 2019-01-01 PROCEDURE — 71250 CT THORAX DX C-: CPT | Mod: TC

## 2019-01-01 PROCEDURE — 99223 1ST HOSP IP/OBS HIGH 75: CPT | Mod: ,,, | Performed by: SURGERY

## 2019-01-01 PROCEDURE — 99900026 HC AIRWAY MAINTENANCE (STAT)

## 2019-01-01 PROCEDURE — 84443 ASSAY THYROID STIM HORMONE: CPT

## 2019-01-01 PROCEDURE — 20000000 HC ICU ROOM

## 2019-01-01 PROCEDURE — 87040 BLOOD CULTURE FOR BACTERIA: CPT

## 2019-01-01 PROCEDURE — 25000003 PHARM REV CODE 250

## 2019-01-01 PROCEDURE — G8979 MOBILITY GOAL STATUS: HCPCS | Mod: CL

## 2019-01-01 PROCEDURE — 87086 URINE CULTURE/COLONY COUNT: CPT

## 2019-01-01 PROCEDURE — 70450 CT HEAD/BRAIN W/O DYE: CPT | Mod: 26,,, | Performed by: RADIOLOGY

## 2019-01-01 PROCEDURE — 97165 OT EVAL LOW COMPLEX 30 MIN: CPT

## 2019-01-01 PROCEDURE — 83605 ASSAY OF LACTIC ACID: CPT

## 2019-01-01 PROCEDURE — 99223 PR INITIAL HOSPITAL CARE,LEVL III: ICD-10-PCS | Mod: ,,, | Performed by: SURGERY

## 2019-01-01 PROCEDURE — 87077 CULTURE AEROBIC IDENTIFY: CPT

## 2019-01-01 PROCEDURE — 94002 VENT MGMT INPAT INIT DAY: CPT

## 2019-01-01 PROCEDURE — 70450 CT HEAD/BRAIN W/O DYE: CPT | Mod: TC

## 2019-01-01 PROCEDURE — 96368 THER/DIAG CONCURRENT INF: CPT

## 2019-01-01 PROCEDURE — 96374 THER/PROPH/DIAG INJ IV PUSH: CPT

## 2019-01-01 PROCEDURE — 84439 ASSAY OF FREE THYROXINE: CPT

## 2019-01-01 PROCEDURE — 36556 INSERT NON-TUNNEL CV CATH: CPT

## 2019-01-01 PROCEDURE — 71250 CT THORAX DX C-: CPT | Mod: 26,,, | Performed by: RADIOLOGY

## 2019-01-01 PROCEDURE — 99284 EMERGENCY DEPT VISIT MOD MDM: CPT

## 2019-01-01 PROCEDURE — 27200966 HC CLOSED SUCTION SYSTEM

## 2019-01-01 PROCEDURE — 71250 CT CHEST WITHOUT CONTRAST: ICD-10-PCS | Mod: 26,,, | Performed by: RADIOLOGY

## 2019-01-01 PROCEDURE — 87186 SC STD MICRODIL/AGAR DIL: CPT

## 2019-01-01 RX ORDER — LEVOTHYROXINE SODIUM ANHYDROUS 100 UG/5ML
100 INJECTION, POWDER, LYOPHILIZED, FOR SOLUTION INTRAVENOUS DAILY
Status: DISCONTINUED | OUTPATIENT
Start: 2019-01-01 | End: 2019-01-01 | Stop reason: HOSPADM

## 2019-01-01 RX ORDER — PANTOPRAZOLE SODIUM 40 MG/1
40 TABLET, DELAYED RELEASE ORAL DAILY
Status: ON HOLD | COMMUNITY
End: 2019-01-01 | Stop reason: HOSPADM

## 2019-01-01 RX ORDER — HYOSCYAMINE SULFATE 0.12 MG/1
0.12 TABLET SUBLINGUAL
Status: DISCONTINUED | OUTPATIENT
Start: 2019-01-01 | End: 2019-06-27 | Stop reason: HOSPADM

## 2019-01-01 RX ORDER — AMIODARONE HYDROCHLORIDE 100 MG/1
200 TABLET ORAL 2 TIMES DAILY
Status: DISCONTINUED | OUTPATIENT
Start: 2019-01-01 | End: 2019-01-01 | Stop reason: HOSPADM

## 2019-01-01 RX ORDER — SODIUM CHLORIDE 9 MG/ML
INJECTION, SOLUTION INTRAVENOUS CONTINUOUS
Status: DISCONTINUED | OUTPATIENT
Start: 2019-01-01 | End: 2019-06-27 | Stop reason: HOSPADM

## 2019-01-01 RX ORDER — ACETAMINOPHEN 325 MG/1
650 TABLET ORAL EVERY 6 HOURS PRN
Status: DISCONTINUED | OUTPATIENT
Start: 2019-01-01 | End: 2019-01-01 | Stop reason: HOSPADM

## 2019-01-01 RX ORDER — PROMETHAZINE HYDROCHLORIDE 25 MG/1
25 SUPPOSITORY RECTAL EVERY 6 HOURS PRN
Status: DISCONTINUED | OUTPATIENT
Start: 2019-01-01 | End: 2019-06-27 | Stop reason: HOSPADM

## 2019-01-01 RX ORDER — FUROSEMIDE 10 MG/ML
40 INJECTION INTRAMUSCULAR; INTRAVENOUS 2 TIMES DAILY
Status: DISCONTINUED | OUTPATIENT
Start: 2019-01-01 | End: 2019-01-01

## 2019-01-01 RX ORDER — FUROSEMIDE 10 MG/ML
40 INJECTION INTRAMUSCULAR; INTRAVENOUS 2 TIMES DAILY
Qty: 60 ML | Refills: 0 | Status: SHIPPED | OUTPATIENT
Start: 2019-01-01 | End: 2019-01-01

## 2019-01-01 RX ORDER — PANTOPRAZOLE SODIUM 40 MG/1
40 TABLET, DELAYED RELEASE ORAL DAILY
Status: DISCONTINUED | OUTPATIENT
Start: 2019-01-01 | End: 2019-01-01

## 2019-01-01 RX ORDER — LEVOFLOXACIN 750 MG/1
750 TABLET ORAL DAILY
Status: ON HOLD | COMMUNITY
End: 2019-01-01 | Stop reason: HOSPADM

## 2019-01-01 RX ORDER — CIPROFLOXACIN 250 MG/1
500 TABLET, FILM COATED ORAL EVERY 12 HOURS
Status: DISCONTINUED | OUTPATIENT
Start: 2019-01-01 | End: 2019-01-01 | Stop reason: HOSPADM

## 2019-01-01 RX ORDER — FUROSEMIDE 10 MG/ML
40 INJECTION INTRAMUSCULAR; INTRAVENOUS ONCE
Status: COMPLETED | OUTPATIENT
Start: 2019-01-01 | End: 2019-01-01

## 2019-01-01 RX ORDER — FERROUS GLUCONATE 324(37.5)
324 TABLET ORAL 2 TIMES DAILY
Status: DISCONTINUED | OUTPATIENT
Start: 2019-01-01 | End: 2019-01-01 | Stop reason: HOSPADM

## 2019-01-01 RX ORDER — PANTOPRAZOLE SODIUM 40 MG/10ML
40 INJECTION, POWDER, LYOPHILIZED, FOR SOLUTION INTRAVENOUS DAILY
Status: DISCONTINUED | OUTPATIENT
Start: 2019-01-01 | End: 2019-01-01 | Stop reason: HOSPADM

## 2019-01-01 RX ORDER — ONDANSETRON 2 MG/ML
4 INJECTION INTRAMUSCULAR; INTRAVENOUS EVERY 8 HOURS PRN
Status: DISCONTINUED | OUTPATIENT
Start: 2019-01-01 | End: 2019-01-01 | Stop reason: HOSPADM

## 2019-01-01 RX ORDER — CIPROFLOXACIN 500 MG/1
500 TABLET ORAL EVERY 12 HOURS
Qty: 20 TABLET | Refills: 0 | Status: SHIPPED | OUTPATIENT
Start: 2019-01-01 | End: 2019-01-01

## 2019-01-01 RX ORDER — POTASSIUM CHLORIDE 20 MEQ/1
20 TABLET, EXTENDED RELEASE ORAL 2 TIMES DAILY
Status: DISCONTINUED | OUTPATIENT
Start: 2019-01-01 | End: 2019-01-01 | Stop reason: HOSPADM

## 2019-01-01 RX ORDER — MIDAZOLAM HYDROCHLORIDE 5 MG/ML
INJECTION INTRAMUSCULAR; INTRAVENOUS
Status: COMPLETED
Start: 2019-01-01 | End: 2019-01-01

## 2019-01-01 RX ORDER — NOREPINEPHRINE BITARTRATE/D5W 8 MG/250ML
0.3 PLASTIC BAG, INJECTION (ML) INTRAVENOUS CONTINUOUS
Status: DISCONTINUED | OUTPATIENT
Start: 2019-01-01 | End: 2019-06-27 | Stop reason: HOSPADM

## 2019-01-01 RX ORDER — FUROSEMIDE 20 MG/1
40 TABLET ORAL 2 TIMES DAILY
Status: DISCONTINUED | OUTPATIENT
Start: 2019-01-01 | End: 2019-01-01

## 2019-01-01 RX ORDER — NOREPINEPHRINE BITARTRATE/D5W 8 MG/250ML
0.02 PLASTIC BAG, INJECTION (ML) INTRAVENOUS CONTINUOUS
Status: DISCONTINUED | OUTPATIENT
Start: 2019-01-01 | End: 2019-01-01 | Stop reason: HOSPADM

## 2019-01-01 RX ORDER — DOBUTAMINE HYDROCHLORIDE 200 MG/100ML
3 INJECTION INTRAVENOUS CONTINUOUS
Status: DISCONTINUED | OUTPATIENT
Start: 2019-01-01 | End: 2019-01-01

## 2019-01-01 RX ORDER — CYPROHEPTADINE HYDROCHLORIDE 4 MG/1
4 TABLET ORAL 3 TIMES DAILY PRN
Status: ON HOLD | COMMUNITY
End: 2019-01-01 | Stop reason: HOSPADM

## 2019-01-01 RX ORDER — PANTOPRAZOLE SODIUM 40 MG/1
40 TABLET, DELAYED RELEASE ORAL DAILY
Status: DISCONTINUED | OUTPATIENT
Start: 2019-01-01 | End: 2019-01-01 | Stop reason: HOSPADM

## 2019-01-01 RX ORDER — POLYETHYLENE GLYCOL 3350 17 G/17G
17 POWDER, FOR SOLUTION ORAL DAILY
Status: ON HOLD | COMMUNITY
End: 2019-01-01 | Stop reason: HOSPADM

## 2019-01-01 RX ORDER — DEXAMETHASONE SODIUM PHOSPHATE 100 MG/10ML
10 INJECTION INTRAMUSCULAR; INTRAVENOUS
Status: COMPLETED | OUTPATIENT
Start: 2019-01-01 | End: 2019-01-01

## 2019-01-01 RX ORDER — NOREPINEPHRINE BITARTRATE/D5W 8 MG/250ML
PLASTIC BAG, INJECTION (ML) INTRAVENOUS
Status: COMPLETED
Start: 2019-01-01 | End: 2019-01-01

## 2019-01-01 RX ORDER — LISINOPRIL 5 MG/1
5 TABLET ORAL DAILY
Qty: 90 TABLET | Refills: 3 | Status: ON HOLD | OUTPATIENT
Start: 2019-01-01 | End: 2019-01-01 | Stop reason: HOSPADM

## 2019-01-01 RX ORDER — CIPROFLOXACIN 2 MG/ML
400 INJECTION, SOLUTION INTRAVENOUS
Status: DISCONTINUED | OUTPATIENT
Start: 2019-01-01 | End: 2019-01-01

## 2019-01-01 RX ORDER — METHOCARBAMOL 500 MG/1
500 TABLET, FILM COATED ORAL 4 TIMES DAILY
Status: ON HOLD | COMMUNITY
End: 2019-01-01 | Stop reason: HOSPADM

## 2019-01-01 RX ORDER — DIPHENHYDRAMINE HCL 25 MG
25 CAPSULE ORAL EVERY 6 HOURS PRN
Status: DISCONTINUED | OUTPATIENT
Start: 2019-01-01 | End: 2019-01-01 | Stop reason: HOSPADM

## 2019-01-01 RX ORDER — FUROSEMIDE 10 MG/ML
40 INJECTION INTRAMUSCULAR; INTRAVENOUS 2 TIMES DAILY
Status: DISCONTINUED | OUTPATIENT
Start: 2019-01-01 | End: 2019-01-01 | Stop reason: HOSPADM

## 2019-01-01 RX ORDER — SPIRONOLACTONE 25 MG/1
25 TABLET ORAL DAILY
Status: DISCONTINUED | OUTPATIENT
Start: 2019-01-01 | End: 2019-01-01 | Stop reason: HOSPADM

## 2019-01-01 RX ORDER — ACETAMINOPHEN 325 MG/1
TABLET ORAL
Status: COMPLETED
Start: 2019-01-01 | End: 2019-01-01

## 2019-01-01 RX ORDER — MORPHINE SULFATE 4 MG/ML
4 INJECTION, SOLUTION INTRAMUSCULAR; INTRAVENOUS
Status: DISCONTINUED | OUTPATIENT
Start: 2019-01-01 | End: 2019-06-27 | Stop reason: HOSPADM

## 2019-01-01 RX ORDER — SODIUM CHLORIDE 9 MG/ML
INJECTION, SOLUTION INTRAVENOUS CONTINUOUS
Status: DISCONTINUED | OUTPATIENT
Start: 2019-01-01 | End: 2019-01-01 | Stop reason: HOSPADM

## 2019-01-01 RX ORDER — LIDOCAINE 50 MG/G
1 PATCH TOPICAL
Status: DISCONTINUED | OUTPATIENT
Start: 2019-01-01 | End: 2019-01-01 | Stop reason: HOSPADM

## 2019-01-01 RX ORDER — SPIRONOLACTONE 25 MG/1
25 TABLET ORAL DAILY
Qty: 30 TABLET | Refills: 11 | Status: ON HOLD | OUTPATIENT
Start: 2019-01-01 | End: 2019-01-01 | Stop reason: HOSPADM

## 2019-01-01 RX ORDER — DOBUTAMINE HYDROCHLORIDE 200 MG/100ML
2 INJECTION INTRAVENOUS CONTINUOUS
Status: DISCONTINUED | OUTPATIENT
Start: 2019-01-01 | End: 2019-01-01

## 2019-01-01 RX ORDER — CARVEDILOL 3.12 MG/1
3.12 TABLET ORAL 2 TIMES DAILY WITH MEALS
Status: DISCONTINUED | OUTPATIENT
Start: 2019-01-01 | End: 2019-01-01

## 2019-01-01 RX ORDER — IPRATROPIUM BROMIDE AND ALBUTEROL SULFATE 2.5; .5 MG/3ML; MG/3ML
3 SOLUTION RESPIRATORY (INHALATION) EVERY 4 HOURS
Status: DISCONTINUED | OUTPATIENT
Start: 2019-01-01 | End: 2019-01-01 | Stop reason: HOSPADM

## 2019-01-01 RX ORDER — LORAZEPAM 2 MG/ML
2 INJECTION INTRAMUSCULAR
Status: DISCONTINUED | OUTPATIENT
Start: 2019-01-01 | End: 2019-06-27 | Stop reason: HOSPADM

## 2019-01-01 RX ORDER — PANTOPRAZOLE SODIUM 40 MG/10ML
40 INJECTION, POWDER, LYOPHILIZED, FOR SOLUTION INTRAVENOUS DAILY
Status: DISCONTINUED | OUTPATIENT
Start: 2019-01-01 | End: 2019-01-01

## 2019-01-01 RX ORDER — SODIUM CHLORIDE 9 MG/ML
INJECTION, SOLUTION INTRAVENOUS CONTINUOUS
Status: DISCONTINUED | OUTPATIENT
Start: 2019-01-01 | End: 2019-01-01

## 2019-01-01 RX ORDER — DEXTROSE MONOHYDRATE AND SODIUM CHLORIDE 5; .45 G/100ML; G/100ML
INJECTION, SOLUTION INTRAVENOUS CONTINUOUS
Status: DISCONTINUED | OUTPATIENT
Start: 2019-01-01 | End: 2019-01-01

## 2019-01-01 RX ORDER — MORPHINE SULFATE 4 MG/ML
2 INJECTION, SOLUTION INTRAMUSCULAR; INTRAVENOUS
Status: DISCONTINUED | OUTPATIENT
Start: 2019-01-01 | End: 2019-06-27 | Stop reason: HOSPADM

## 2019-01-01 RX ORDER — DOBUTAMINE HYDROCHLORIDE 200 MG/100ML
2.5 INJECTION INTRAVENOUS CONTINUOUS
Status: DISCONTINUED | OUTPATIENT
Start: 2019-01-01 | End: 2019-01-01 | Stop reason: HOSPADM

## 2019-01-01 RX ORDER — AMIODARONE HYDROCHLORIDE 200 MG/1
200 TABLET ORAL 2 TIMES DAILY
Qty: 60 TABLET | Refills: 11 | Status: ON HOLD | OUTPATIENT
Start: 2019-01-01 | End: 2019-01-01 | Stop reason: HOSPADM

## 2019-01-01 RX ORDER — AMIODARONE HYDROCHLORIDE 100 MG/1
200 TABLET ORAL DAILY
Status: DISCONTINUED | OUTPATIENT
Start: 2019-01-01 | End: 2019-01-01

## 2019-01-01 RX ORDER — MIDODRINE HYDROCHLORIDE 2.5 MG/1
2.5 TABLET ORAL 3 TIMES DAILY
Status: ON HOLD | COMMUNITY
End: 2019-01-01 | Stop reason: HOSPADM

## 2019-01-01 RX ORDER — FUROSEMIDE 10 MG/ML
INJECTION INTRAMUSCULAR; INTRAVENOUS
Status: COMPLETED
Start: 2019-01-01 | End: 2019-01-01

## 2019-01-01 RX ORDER — ESCITALOPRAM OXALATE 10 MG/1
10 TABLET ORAL DAILY
Status: ON HOLD | COMMUNITY
End: 2019-01-01 | Stop reason: HOSPADM

## 2019-01-01 RX ORDER — HYDROXYZINE PAMOATE 25 MG/1
25 CAPSULE ORAL EVERY 8 HOURS PRN
Status: ON HOLD | COMMUNITY
End: 2019-01-01 | Stop reason: HOSPADM

## 2019-01-01 RX ORDER — DOBUTAMINE HYDROCHLORIDE 200 MG/100ML
INJECTION INTRAVENOUS
Status: COMPLETED
Start: 2019-01-01 | End: 2019-01-01

## 2019-01-01 RX ORDER — ENOXAPARIN SODIUM 100 MG/ML
40 INJECTION SUBCUTANEOUS EVERY 24 HOURS
Status: DISCONTINUED | OUTPATIENT
Start: 2019-01-01 | End: 2019-01-01 | Stop reason: CLARIF

## 2019-01-01 RX ORDER — DIPHENHYDRAMINE HCL 25 MG
CAPSULE ORAL
Status: COMPLETED
Start: 2019-01-01 | End: 2019-01-01

## 2019-01-01 RX ORDER — DOBUTAMINE HYDROCHLORIDE 400 MG/100ML
6 INJECTION, SOLUTION INTRAVENOUS CONTINUOUS
Status: DISCONTINUED | OUTPATIENT
Start: 2019-01-01 | End: 2019-06-27 | Stop reason: HOSPADM

## 2019-01-01 RX ORDER — IPRATROPIUM BROMIDE AND ALBUTEROL SULFATE 2.5; .5 MG/3ML; MG/3ML
3 SOLUTION RESPIRATORY (INHALATION) EVERY 6 HOURS PRN
Status: ON HOLD | COMMUNITY
End: 2019-01-01 | Stop reason: HOSPADM

## 2019-01-01 RX ORDER — ESCITALOPRAM OXALATE 10 MG/1
10 TABLET ORAL DAILY
Status: DISCONTINUED | OUTPATIENT
Start: 2019-01-01 | End: 2019-01-01

## 2019-01-01 RX ORDER — CARVEDILOL 3.12 MG/1
3.12 TABLET ORAL 2 TIMES DAILY WITH MEALS
Status: ON HOLD | COMMUNITY
End: 2019-01-01 | Stop reason: HOSPADM

## 2019-01-01 RX ORDER — ENOXAPARIN SODIUM 100 MG/ML
30 INJECTION SUBCUTANEOUS EVERY 24 HOURS
Status: DISCONTINUED | OUTPATIENT
Start: 2019-01-01 | End: 2019-01-01 | Stop reason: HOSPADM

## 2019-01-01 RX ORDER — FAMOTIDINE 10 MG/1
10 TABLET ORAL 2 TIMES DAILY
Status: ON HOLD | COMMUNITY
End: 2019-01-01 | Stop reason: HOSPADM

## 2019-01-01 RX ORDER — ATROPINE SULFATE 0.1 MG/ML
0.5 INJECTION INTRAVENOUS ONCE
Status: COMPLETED | OUTPATIENT
Start: 2019-01-01 | End: 2019-01-01

## 2019-01-01 RX ORDER — FERROUS GLUCONATE 324(37.5)
324 TABLET ORAL 2 TIMES DAILY
Qty: 60 TABLET | Refills: 11 | Status: ON HOLD | COMMUNITY
Start: 2019-01-01 | End: 2019-01-01 | Stop reason: HOSPADM

## 2019-01-01 RX ORDER — MAG HYDROX/ALUMINUM HYD/SIMETH 200-200-20
30 SUSPENSION, ORAL (FINAL DOSE FORM) ORAL EVERY 4 HOURS PRN
Status: DISCONTINUED | OUTPATIENT
Start: 2019-01-01 | End: 2019-01-01 | Stop reason: HOSPADM

## 2019-01-01 RX ORDER — ONDANSETRON 4 MG/1
4 TABLET, FILM COATED ORAL EVERY 6 HOURS PRN
Status: ON HOLD | COMMUNITY
End: 2019-01-01 | Stop reason: HOSPADM

## 2019-01-01 RX ORDER — MIDODRINE HYDROCHLORIDE 2.5 MG/1
2.5 TABLET ORAL 3 TIMES DAILY
Status: DISCONTINUED | OUTPATIENT
Start: 2019-01-01 | End: 2019-01-01 | Stop reason: HOSPADM

## 2019-01-01 RX ORDER — FUROSEMIDE 40 MG/1
20 TABLET ORAL DAILY
Status: ON HOLD | COMMUNITY
End: 2019-01-01 | Stop reason: HOSPADM

## 2019-01-01 RX ORDER — NOREPINEPHRINE BITARTRATE/D5W 8 MG/250ML
0.02 PLASTIC BAG, INJECTION (ML) INTRAVENOUS CONTINUOUS
Status: DISCONTINUED | OUTPATIENT
Start: 2019-01-01 | End: 2019-01-01

## 2019-01-01 RX ORDER — ACETAMINOPHEN 650 MG/1
650 SUPPOSITORY RECTAL EVERY 4 HOURS PRN
Status: DISCONTINUED | OUTPATIENT
Start: 2019-01-01 | End: 2019-06-27 | Stop reason: HOSPADM

## 2019-01-01 RX ORDER — LEVOTHYROXINE SODIUM ANHYDROUS 100 UG/5ML
100 INJECTION, POWDER, LYOPHILIZED, FOR SOLUTION INTRAVENOUS DAILY
Status: DISCONTINUED | OUTPATIENT
Start: 2019-01-01 | End: 2019-01-01

## 2019-01-01 RX ADMIN — PANTOPRAZOLE SODIUM 40 MG: 40 TABLET, DELAYED RELEASE ORAL at 08:03

## 2019-01-01 RX ADMIN — SPIRONOLACTONE 25 MG: 25 TABLET ORAL at 11:03

## 2019-01-01 RX ADMIN — ACETAMINOPHEN 650 MG: 325 TABLET ORAL at 09:03

## 2019-01-01 RX ADMIN — ESCITALOPRAM OXALATE 10 MG: 10 TABLET, FILM COATED ORAL at 08:03

## 2019-01-01 RX ADMIN — APIXABAN 2.5 MG: 2.5 TABLET, FILM COATED ORAL at 08:03

## 2019-01-01 RX ADMIN — SODIUM CHLORIDE: 9 INJECTION, SOLUTION INTRAVENOUS at 11:06

## 2019-01-01 RX ADMIN — APIXABAN 2.5 MG: 2.5 TABLET, FILM COATED ORAL at 09:03

## 2019-01-01 RX ADMIN — MIDAZOLAM 3 MG/HR: 5 INJECTION INTRAMUSCULAR; INTRAVENOUS at 07:06

## 2019-01-01 RX ADMIN — FERROUS GLUCONATE TAB 324 MG (37.5 MG ELEMENTAL IRON) 324 MG: 324 (37.5 FE) TAB at 10:03

## 2019-01-01 RX ADMIN — DIPHENHYDRAMINE HYDROCHLORIDE 25 MG: 25 CAPSULE ORAL at 12:03

## 2019-01-01 RX ADMIN — DOBUTAMINE HYDROCHLORIDE 6 MCG/KG/MIN: 200 INJECTION INTRAVENOUS at 06:06

## 2019-01-01 RX ADMIN — Medication 25 MG: at 12:03

## 2019-01-01 RX ADMIN — FERROUS GLUCONATE TAB 324 MG (37.5 MG ELEMENTAL IRON) 324 MG: 324 (37.5 FE) TAB at 09:03

## 2019-01-01 RX ADMIN — CIPROFLOXACIN HYDROCHLORIDE 500 MG: 250 TABLET, FILM COATED ORAL at 09:03

## 2019-01-01 RX ADMIN — DEXTROSE AND SODIUM CHLORIDE: 5; .45 INJECTION, SOLUTION INTRAVENOUS at 10:03

## 2019-01-01 RX ADMIN — Medication 0.3 MCG/KG/MIN: at 03:06

## 2019-01-01 RX ADMIN — SPIRONOLACTONE 25 MG: 25 TABLET ORAL at 09:03

## 2019-01-01 RX ADMIN — DOBUTAMINE IN DEXTROSE 3 MCG/KG/MIN: 200 INJECTION, SOLUTION INTRAVENOUS at 10:03

## 2019-01-01 RX ADMIN — PANTOPRAZOLE SODIUM 40 MG: 40 TABLET, DELAYED RELEASE ORAL at 09:03

## 2019-01-01 RX ADMIN — POTASSIUM CHLORIDE 20 MEQ: 1500 TABLET, EXTENDED RELEASE ORAL at 09:03

## 2019-01-01 RX ADMIN — FERROUS GLUCONATE TAB 324 MG (37.5 MG ELEMENTAL IRON) 324 MG: 324 (37.5 FE) TAB at 08:03

## 2019-01-01 RX ADMIN — POTASSIUM CHLORIDE 20 MEQ: 1500 TABLET, EXTENDED RELEASE ORAL at 08:03

## 2019-01-01 RX ADMIN — MIDODRINE HYDROCHLORIDE 2.5 MG: 2.5 TABLET ORAL at 09:03

## 2019-01-01 RX ADMIN — Medication 0.3 MCG/KG/MIN: at 05:06

## 2019-01-01 RX ADMIN — LIDOCAINE 1 PATCH: 50 PATCH CUTANEOUS at 08:03

## 2019-01-01 RX ADMIN — CIPROFLOXACIN HYDROCHLORIDE 500 MG: 250 TABLET, FILM COATED ORAL at 08:03

## 2019-01-01 RX ADMIN — Medication 1 G: at 12:06

## 2019-01-01 RX ADMIN — DOBUTAMINE HYDROCHLORIDE 6 MCG/KG/MIN: 200 INJECTION INTRAVENOUS at 10:06

## 2019-01-01 RX ADMIN — Medication 0.02 MCG/KG/MIN: at 03:06

## 2019-01-01 RX ADMIN — FUROSEMIDE 40 MG: 10 INJECTION INTRAMUSCULAR; INTRAVENOUS at 03:06

## 2019-01-01 RX ADMIN — CIPROFLOXACIN 400 MG: 2 INJECTION, SOLUTION INTRAVENOUS at 08:03

## 2019-01-01 RX ADMIN — FUROSEMIDE 40 MG: 10 INJECTION, SOLUTION INTRAVENOUS at 09:03

## 2019-01-01 RX ADMIN — FUROSEMIDE 40 MG: 20 TABLET ORAL at 08:03

## 2019-01-01 RX ADMIN — FUROSEMIDE 40 MG: 10 INJECTION, SOLUTION INTRAVENOUS at 08:03

## 2019-01-01 RX ADMIN — IPRATROPIUM BROMIDE AND ALBUTEROL SULFATE 3 ML: .5; 3 SOLUTION RESPIRATORY (INHALATION) at 07:06

## 2019-01-01 RX ADMIN — IPRATROPIUM BROMIDE AND ALBUTEROL SULFATE 3 ML: .5; 3 SOLUTION RESPIRATORY (INHALATION) at 11:06

## 2019-01-01 RX ADMIN — AMIODARONE HYDROCHLORIDE 200 MG: 100 TABLET ORAL at 09:03

## 2019-01-01 RX ADMIN — LIDOCAINE 1 PATCH: 50 PATCH CUTANEOUS at 10:03

## 2019-01-01 RX ADMIN — Medication 0.02 MCG/KG/MIN: at 04:06

## 2019-01-01 RX ADMIN — FUROSEMIDE 40 MG: 10 INJECTION, SOLUTION INTRAMUSCULAR; INTRAVENOUS at 12:06

## 2019-01-01 RX ADMIN — DOBUTAMINE HYDROCHLORIDE 2 MCG/KG/MIN: 200 INJECTION INTRAVENOUS at 12:06

## 2019-01-01 RX ADMIN — LIDOCAINE 1 PATCH: 50 PATCH CUTANEOUS at 07:03

## 2019-01-01 RX ADMIN — TUBERCULIN PURIFIED PROTEIN DERIVATIVE 5 UNITS: 5 INJECTION, SOLUTION INTRADERMAL at 10:03

## 2019-01-01 RX ADMIN — IPRATROPIUM BROMIDE AND ALBUTEROL SULFATE 3 ML: .5; 3 SOLUTION RESPIRATORY (INHALATION) at 03:06

## 2019-01-01 RX ADMIN — DEXTROSE AND SODIUM CHLORIDE: 5; .45 INJECTION, SOLUTION INTRAVENOUS at 01:03

## 2019-01-01 RX ADMIN — SODIUM CHLORIDE: 9 INJECTION, SOLUTION INTRAVENOUS at 04:03

## 2019-01-01 RX ADMIN — SODIUM CHLORIDE 500 ML: 9 INJECTION, SOLUTION INTRAVENOUS at 10:06

## 2019-01-01 RX ADMIN — AMIODARONE HYDROCHLORIDE 200 MG: 100 TABLET ORAL at 08:03

## 2019-01-01 RX ADMIN — LORAZEPAM 2 MG: 2 INJECTION INTRAMUSCULAR; INTRAVENOUS at 03:06

## 2019-01-01 RX ADMIN — FUROSEMIDE 40 MG: 10 INJECTION, SOLUTION INTRAVENOUS at 06:03

## 2019-01-01 RX ADMIN — LORAZEPAM 2 MG: 2 INJECTION INTRAMUSCULAR; INTRAVENOUS at 11:06

## 2019-01-01 RX ADMIN — MIDODRINE HYDROCHLORIDE 2.5 MG: 2.5 TABLET ORAL at 03:03

## 2019-01-01 RX ADMIN — SODIUM CHLORIDE: 9 INJECTION, SOLUTION INTRAVENOUS at 03:03

## 2019-01-01 RX ADMIN — Medication 0.3 MCG/KG/MIN: at 07:06

## 2019-01-01 RX ADMIN — PANTOPRAZOLE SODIUM 40 MG: 40 INJECTION, POWDER, FOR SOLUTION INTRAVENOUS at 12:06

## 2019-01-01 RX ADMIN — CEFTRIAXONE 2 G: 2 INJECTION, POWDER, FOR SOLUTION INTRAMUSCULAR; INTRAVENOUS at 12:06

## 2019-01-01 RX ADMIN — AZITHROMYCIN MONOHYDRATE 500 MG: 500 INJECTION, POWDER, LYOPHILIZED, FOR SOLUTION INTRAVENOUS at 11:06

## 2019-01-01 RX ADMIN — MIDODRINE HYDROCHLORIDE 2.5 MG: 2.5 TABLET ORAL at 08:03

## 2019-01-01 RX ADMIN — FUROSEMIDE 40 MG: 20 TABLET ORAL at 09:03

## 2019-01-01 RX ADMIN — SPIRONOLACTONE 25 MG: 25 TABLET ORAL at 08:03

## 2019-01-01 RX ADMIN — MIDAZOLAM 5 MG: 5 INJECTION INTRAMUSCULAR; INTRAVENOUS at 08:06

## 2019-01-01 RX ADMIN — LEVOTHYROXINE SODIUM ANHYDROUS 100 MCG: 100 INJECTION, POWDER, LYOPHILIZED, FOR SOLUTION INTRAVENOUS at 09:06

## 2019-01-01 RX ADMIN — Medication 0.3 MCG/KG/MIN: at 10:06

## 2019-01-01 RX ADMIN — FUROSEMIDE 40 MG: 10 INJECTION, SOLUTION INTRAVENOUS at 10:03

## 2019-01-01 RX ADMIN — SODIUM CHLORIDE 100 ML/HR: 9 INJECTION, SOLUTION INTRAVENOUS at 11:06

## 2019-01-01 RX ADMIN — ATROPINE SULFATE 0.5 MG: 0.1 INJECTION PARENTERAL at 08:06

## 2019-01-01 RX ADMIN — Medication 0.3 MCG/KG/MIN: at 02:06

## 2019-01-01 RX ADMIN — ENOXAPARIN SODIUM 30 MG: 100 INJECTION SUBCUTANEOUS at 06:06

## 2019-01-01 RX ADMIN — CIPROFLOXACIN 400 MG: 2 INJECTION, SOLUTION INTRAVENOUS at 09:03

## 2019-01-01 RX ADMIN — FUROSEMIDE 40 MG: 10 INJECTION, SOLUTION INTRAMUSCULAR; INTRAVENOUS at 03:06

## 2019-01-01 RX ADMIN — SODIUM CHLORIDE 1000 ML: 9 INJECTION, SOLUTION INTRAVENOUS at 12:06

## 2019-01-01 RX ADMIN — MIDAZOLAM: 5 INJECTION INTRAMUSCULAR; INTRAVENOUS at 09:06

## 2019-01-01 RX ADMIN — PANTOPRAZOLE SODIUM 40 MG: 40 INJECTION, POWDER, FOR SOLUTION INTRAVENOUS at 09:06

## 2019-01-01 RX ADMIN — LIDOCAINE 1 PATCH: 50 PATCH CUTANEOUS at 06:03

## 2019-01-01 RX ADMIN — Medication 0.4 MCG/KG/MIN: at 06:06

## 2019-01-01 RX ADMIN — ALUMINUM HYDROXIDE, MAGNESIUM HYDROXIDE, AND SIMETHICONE 30 ML: 200; 200; 20 SUSPENSION ORAL at 10:03

## 2019-01-01 RX ADMIN — FUROSEMIDE 40 MG: 10 INJECTION, SOLUTION INTRAVENOUS at 05:03

## 2019-01-01 RX ADMIN — DOBUTAMINE IN DEXTROSE 2 MCG/KG/MIN: 200 INJECTION, SOLUTION INTRAVENOUS at 12:06

## 2019-01-01 RX ADMIN — DEXAMETHASONE SODIUM PHOSPHATE 10 MG: 10 INJECTION INTRAMUSCULAR; INTRAVENOUS at 12:06

## 2019-03-08 NOTE — DISCHARGE INSTRUCTIONS
CT scan of the brain reveals normal skull and no evidence of hemorrhage    Basic wound care of skin tears and facial laceration and contusion    Continue current medication    Fall precautions    Follow-up primary care for provider

## 2019-03-09 NOTE — ED PROVIDER NOTES
Encounter Date: 3/8/2019       History     Chief Complaint   Patient presents with    Fall     pt reports falling while transferring from wheelchair to recliner, denies loc     85-year-old male anticoagulated chronically presents with fall from chair as transferring with skin tears of extremity and contusion laceration of the right supraorbital region    He is rapidly restaurant taken to CT scan where CT scan noncontrast reveals no acute fracture nor intercerebral hemorrhage    Wound care performed at the bedside in room 2 with nursing    Physical exam reveals the chronic AFib he is with normal neurological exam GCS 15 normal motion upper lower extremities normal speech          Review of patient's allergies indicates:  No Known Allergies  Past Medical History:   Diagnosis Date    A-fib     Anxiety     Depression      Past Surgical History:   Procedure Laterality Date    ANKLE SURGERY Left      History reviewed. No pertinent family history.  Social History     Tobacco Use    Smoking status: Never Smoker   Substance Use Topics    Alcohol use: No     Frequency: Never    Drug use: No     Review of Systems   Constitutional: Negative.    Respiratory: Negative.    Cardiovascular: Negative.    Gastrointestinal: Negative.    Musculoskeletal: Negative.    Skin: Positive for wound.   Neurological: Negative.    Hematological: Bruises/bleeds easily.   Psychiatric/Behavioral: Negative for confusion.   All other systems reviewed and are negative.      Physical Exam     Initial Vitals [03/08/19 0844]   BP Pulse Resp Temp SpO2   114/65 60 20 97.9 °F (36.6 °C) 99 %      MAP       --         Physical Exam    Nursing note and vitals reviewed.  HENT:   Head: Normocephalic. Head is with contusion and with laceration.       Eyes: Conjunctivae and EOM are normal. Pupils are equal, round, and reactive to light.   Neck: Normal range of motion. Neck supple. No spinous process tenderness present. Normal range of motion present.    Cardiovascular: Normal rate, S1 normal, S2 normal and normal heart sounds. An irregularly irregular rhythm present.    Musculoskeletal: Normal range of motion. He exhibits no edema or tenderness.   Neurological: He is alert. He has normal strength. A cranial nerve deficit is present. GCS score is 15. GCS eye subscore is 4. GCS verbal subscore is 5. GCS motor subscore is 6.   Skin: Skin is warm and dry. Capillary refill takes less than 2 seconds.   Skin tears of the right upper extremity         ED Course   Procedures  Labs Reviewed - No data to display       Imaging Results          CT Head Without Contrast (Final result)  Result time 03/08/19 09:12:37    Final result by Carlos Christian MD (03/08/19 09:12:37)                 Impression:      1. Cortical atrophy with periventricular deep white matter change consistent with chronic small vessel ischemic disease.  2. Focal encephalomalacia of the left frontal lobe and left cerebellum from remote ischemia.  3. Small right frontal cephalohematoma.  Dr. Hdz ER notified 09:10 hours 03/08/2019.      Electronically signed by: Carlos Christian  Date:    03/08/2019  Time:    09:12             Narrative:    EXAMINATION:  CT HEAD WITHOUT CONTRAST    CLINICAL HISTORY:  head injury chronic anticoagulation;    TECHNIQUE:  Low dose axial images were obtained through the head.  Coronal and sagittal reformations were also performed. Contrast was not administered.    COMPARISON:  None.    FINDINGS:  There is no acute hemorrhage or infarction.  There is cortical atrophy.  There are periventricular deep white matter changes consistent with chronic small vessel ischemic disease.  There is focal encephalomalacia involving the deep white matter of the posterior left frontal lobe and left cerebellum from remote ischemia.    No extra-axial fluid collections.  Ventricles are normal in size, shape and configuration.  The basal cisterns are patent.    The imaged paranasal sinuses and  ethmoid air cells are well aerated.    The mastoid air cells and middle ears are normally pneumatized.    Small right frontal cephalohematoma.  No underlying bony fracture.                                 Medical Decision Making:   Clinical Tests:   Radiological Study: Ordered and Reviewed  ED Management:    Basic wound care to area of skin tear    Basic wound care to scalp contusion laceration    Reassuring neurological exam    Reassuring neuro imaging with CT    Stable discharge as per AVS    CT scan of the brain reveals normal skull and no evidence of hemorrhage    Basic wound care of skin tears and facial laceration and contusion    Continue current medication    Fall precautions    Follow-up primary care for provider                      Clinical Impression:       ICD-10-CM ICD-9-CM   1. Blunt head trauma, initial encounter S09.8XXA 959.01   2. Chronic anticoagulation Z79.01 V58.61   3. Chronic a-fib I48.2 427.31   4. Skin tear of right forearm without complication, initial encounter S51.811A 881.00         Disposition:   Disposition: Discharged  Condition: Stable                        Dajuan Hdz MD  03/09/19 0837

## 2019-03-21 PROBLEM — E86.0 MILD DEHYDRATION: Status: ACTIVE | Noted: 2019-01-01

## 2019-03-21 NOTE — ED PROVIDER NOTES
Encounter Date: 3/21/2019       History     Chief Complaint   Patient presents with    generalized weakness     Patient presents to the ED, resident of independent living section at a local Nursing facility. Son is working on moving pt to assisted living in the next couple days.   Patient is awake and alert, states that today he has been more weak than usual.  Patient states he was unable to get himself from his bed to his wheelchair which is normal able to do without any assistance.  Patient denies any other complaints.  Denies any pain anywhere.  He states he has been eating and drinking normally.          Review of patient's allergies indicates:  No Known Allergies  Past Medical History:   Diagnosis Date    A-fib     Anxiety     Depression      Past Surgical History:   Procedure Laterality Date    ANKLE SURGERY Left      No family history on file.  Social History     Tobacco Use    Smoking status: Never Smoker   Substance Use Topics    Alcohol use: No     Frequency: Never    Drug use: No     Review of Systems   HENT: Negative.    Eyes: Negative.    Respiratory: Negative.  Negative for shortness of breath.    Cardiovascular: Negative.    Gastrointestinal: Negative.    Endocrine: Negative.    Genitourinary: Negative.    Musculoskeletal: Negative.    Neurological: Positive for weakness.   Hematological: Negative.    Psychiatric/Behavioral: Negative.        Physical Exam     Initial Vitals [03/21/19 1755]   BP Pulse Resp Temp SpO2   115/84 77 18 97.6 °F (36.4 °C) --      MAP       --         Physical Exam    Nursing note and vitals reviewed.  Constitutional: He appears well-developed and well-nourished. He is not diaphoretic. No distress.   HENT:   Head: Normocephalic and atraumatic.   Mucous membranes slightly dry.   Eyes: Pupils are equal, round, and reactive to light.   Neck: Normal range of motion. Neck supple.   Cardiovascular: Normal rate, regular rhythm, normal heart sounds and intact distal pulses.  "Exam reveals no gallop and no friction rub.    No murmur heard.  Pulmonary/Chest: Breath sounds normal. No respiratory distress. He has no wheezes. He has no rhonchi. He has no rales. He exhibits no tenderness.   Abdominal: Soft. Bowel sounds are normal. He exhibits no distension. There is no tenderness. There is no rebound and no guarding.   Musculoskeletal: Normal range of motion. He exhibits no edema.   Neurological: He is alert and oriented to person, place, and time. He has normal strength.   Skin: Skin is warm and dry. Capillary refill takes less than 2 seconds.   Psychiatric: He has a normal mood and affect. His behavior is normal. Judgment and thought content normal.         ED Course   Procedures  Labs Reviewed   CBC W/ AUTO DIFFERENTIAL   COMPREHENSIVE METABOLIC PANEL   URINALYSIS, REFLEX TO URINE CULTURE   MAGNESIUM          Imaging Results          X-Ray Chest AP Portable (In process)                  Medical Decision Making:   ED Management:  Will do Ct without contrast due to pts history of CKD                   ED Course as of Mar 21 2330   Thu Mar 21, 2019   1855 Pt states he will not allow me to place chest tube if he needs one. Hx of pleurX catheter for drainage of chronic pleural effusion. He states this was removed "a while ago".   [MD]   2129 Ct chest: read per Vrad  1. There is right axillary and right chest wall soft tissue swelling and fat stranding, suggesting  contusion.  2. There is a small left pleural effusion. There is a moderate right pleural effusion. There is mild right  pleural thickening.  3. There is thickening of the right major pulmonary fissure. There is a well-circumscribed 4.2 x 3.5 x 4  cm oval density along the posterior right major pulmonary fissure. This is probably loculated pleural  effusion in the major pulmonary fissure. A neoplasm cannot be excluded completely. Recommend  follow up.  4. There are mild opacities in the posterior upper and lower lobes of the right lung " and the lower lobe  of the left lung, suggesting atelectasis, pulmonary contusion, or pneumonia.  5. There is moderate cardiomegaly. There is a small amount of pericardial fluid. There is  atherosclerotic calcification of the coronary arteries.  6. There is a small amount of free peritoneal fluid in the right upper abdomen.  7. There is a 1.7 x 1.2 cm indeterminate hypodensity in the anterior right lobe of the liver.  Recommend follow up.  [MD]   2134 Spoke with Dr. Garibay, will admit for hydration and he asked that I consult surgery for eval of possible drainage of pleural effusion.   [MD]      ED Course User Index  [MD] Marimar Quintero MD     Clinical Impression:       ICD-10-CM ICD-9-CM   1. Mild dehydration E86.0 276.51   2. Weakness R53.1 780.79   3. Loculated pleural effusion J90 511.9                                Marimar Quintero MD  03/21/19 6077

## 2019-03-21 NOTE — ED TRIAGE NOTES
Pt presents with generalized weakness onset two days. Seen and treated here earlier in the week. Reported hx of noncompliance

## 2019-03-22 PROBLEM — I42.2 HYPERTROPHIC NONOBSTRUCTIVE CARDIOMYOPATHY: Status: ACTIVE | Noted: 2019-01-01

## 2019-03-22 PROBLEM — I50.40 COMBINED SYSTOLIC AND DIASTOLIC CONGESTIVE HEART FAILURE: Status: ACTIVE | Noted: 2019-01-01

## 2019-03-22 NOTE — PLAN OF CARE
Problem: Fall Injury Risk  Goal: Absence of Fall and Fall-Related Injury    Intervention: Promote Injury-Free Environment   03/22/19 0200   Optimize Balance and Safe Activity   Safety Promotion/Fall Prevention side rails raised x 2;bed alarm set

## 2019-03-22 NOTE — PLAN OF CARE
03/22/19 1548   LUGO Message   Medicare Outpatient and Observation Notification regarding financial responsibility Given to patient/caregiver;Explained to patient/caregiver;Signed/date by patient/caregiver   Date LUGO was signed 03/22/19   Time LUGO was signed 0925

## 2019-03-22 NOTE — SUBJECTIVE & OBJECTIVE
Past Medical History:   Diagnosis Date    A-fib     Anxiety     Depression        Past Surgical History:   Procedure Laterality Date    ANKLE SURGERY Left        Review of patient's allergies indicates:  No Known Allergies    No current facility-administered medications on file prior to encounter.      Current Outpatient Medications on File Prior to Encounter   Medication Sig    apixaban (ELIQUIS) 2.5 mg Tab Take by mouth 2 (two) times daily.    carvedilol (COREG) 3.125 MG tablet Take 3.125 mg by mouth 2 (two) times daily with meals.    escitalopram oxalate (LEXAPRO) 10 MG tablet Take 10 mg by mouth once daily.    furosemide (LASIX) 40 MG tablet Take 40 mg by mouth 2 (two) times daily.    hydrOXYzine pamoate (VISTARIL) 25 MG Cap Take 25 mg by mouth every 8 (eight) hours as needed.    methocarbamol (ROBAXIN) 500 MG Tab Take 500 mg by mouth 4 (four) times daily.    midodrine (PROAMATINE) 2.5 MG Tab Take 2.5 mg by mouth 3 (three) times daily.    pantoprazole (PROTONIX) 40 MG tablet Take 40 mg by mouth once daily.     Family History     None        Tobacco Use    Smoking status: Never Smoker   Substance and Sexual Activity    Alcohol use: No     Frequency: Never    Drug use: No    Sexual activity: No     Review of Systems   Constitutional: Positive for fatigue. Negative for activity change, appetite change and fever.   HENT: Negative for congestion, ear discharge, mouth sores, nosebleeds, rhinorrhea, sinus pressure, sinus pain and tinnitus.    Eyes: Negative.  Negative for pain, redness and itching.   Respiratory: Positive for cough and shortness of breath. Negative for apnea, choking, chest tightness, wheezing and stridor.    Cardiovascular: Negative for chest pain, palpitations and leg swelling.   Gastrointestinal: Positive for abdominal pain. Negative for abdominal distention, anal bleeding, blood in stool, constipation and diarrhea.   Endocrine: Negative.    Genitourinary: Negative for difficulty  urinating, frequency and urgency.   Musculoskeletal: Positive for arthralgias. Negative for back pain, gait problem and myalgias.   Skin: Negative for color change and pallor.   Allergic/Immunologic: Negative.    Neurological: Positive for weakness. Negative for dizziness, facial asymmetry, light-headedness and headaches.   Hematological: Negative for adenopathy. Does not bruise/bleed easily.     Objective:     Vital Signs (Most Recent):  Temp: 96.5 °F (35.8 °C) (03/21/19 2256)  Pulse: 82 (03/21/19 2256)  Resp: 16 (03/21/19 2256)  BP: 112/78 (03/21/19 2256)  SpO2: 99 % (03/21/19 2256) Vital Signs (24h Range):  Temp:  [96.5 °F (35.8 °C)-97.6 °F (36.4 °C)] 96.5 °F (35.8 °C)  Pulse:  [69-82] 82  Resp:  [15-18] 16  SpO2:  [99 %-100 %] 99 %  BP: (109-115)/(75-84) 112/78     Weight: 67.1 kg (148 lb)  Body mass index is 20.64 kg/m².    Physical Exam   Constitutional: He is oriented to person, place, and time. He appears well-developed and well-nourished.   HENT:   Head: Normocephalic and atraumatic.   Eyes: EOM are normal. Pupils are equal, round, and reactive to light.   Neck: Normal range of motion. Neck supple. No tracheal deviation present. No thyromegaly present.   Cardiovascular: Normal rate, regular rhythm and normal heart sounds.   Pulmonary/Chest: Effort normal. He has wheezes. He has rales.   Abdominal: Soft. Bowel sounds are normal. He exhibits no distension. There is no rebound and no guarding.   Musculoskeletal: Normal range of motion.   Lymphadenopathy:     He has no cervical adenopathy.   Neurological: He is alert and oriented to person, place, and time.   Skin: Skin is warm and dry. Capillary refill takes less than 2 seconds.   Psychiatric: He has a normal mood and affect. His behavior is normal. Judgment and thought content normal.   Nursing note and vitals reviewed.        CRANIAL NERVES     CN III, IV, VI   Pupils are equal, round, and reactive to light.  Extraocular motions are normal.         Significant Labs:   Recent Lab Results       03/22/19  0612   03/21/19  2147   03/21/19  1840        Immature Grans (Abs) 0.03  Comment:  Mild elevation in immature granulocytes is non specific and   can be seen in a variety of conditions including stress response,   acute inflammation, trauma and pregnancy. Correlation with other   laboratory and clinical findings is essential.     0.02  Comment:  Mild elevation in immature granulocytes is non specific and   can be seen in a variety of conditions including stress response,   acute inflammation, trauma and pregnancy. Correlation with other   laboratory and clinical findings is essential.       Albumin     2.7     Alkaline Phosphatase     90     ALT     8     Anion Gap 7   9     Appearance, UA   Clear       AST     17     Bacteria, UA   Many       Baso # 0.04   0.05     Basophil% 0.6   0.7     Bilirubin (UA)   1+  Comment:  Positive urine bilirubin is not confirmed. Correlate with   serum bilirubin and clinical presentation.         Total Bilirubin     2.0  Comment:  For infants and newborns, interpretation of results should be based  on gestational age, weight and in agreement with clinical  observations.  Premature Infant recommended reference ranges:  Up to 24 hours.............<8.0 mg/dL  Up to 48 hours............<12.0 mg/dL  3-5 days..................<15.0 mg/dL  6-29 days.................<15.0 mg/dL       BUN, Bld 22   22     Calcium 8.1   8.5     Chloride 101   95     CO2 29   29     Color, UA   Yellow       Creatinine 0.8   0.9     Differential Method Automated   Automated     eGFR if  >60.0   >60.0     eGFR if non  >60.0  Comment:  Calculation used to obtain the estimated glomerular filtration  rate (eGFR) is the CKD-EPI equation.      >60.0  Comment:  Calculation used to obtain the estimated glomerular filtration  rate (eGFR) is the CKD-EPI equation.        Eos # 0.3   0.3     Eosinophil% 4.0   3.9     Glucose 163   87      Glucose, UA   Negative       Gran # (ANC) 4.3   4.9     Gran% 65.5   67.8     Hematocrit 38.7   44.6     Hemoglobin 12.0   13.8     Hyaline Casts, UA   2       Immature Granulocytes 0.5   0.3     Ketones, UA   Negative       Leukocytes, UA   Trace       Lymph # 1.1   1.2     Lymph% 17.1   17.0     Magnesium     1.9     MCH 24.6   24.7     MCHC 31.0   30.9     MCV 79   80     Microscopic Comment   SEE COMMENT  Comment:  Other formed elements not mentioned in the report are not   present in the microscopic examination.          Mono # 0.8   0.8     Mono% 12.3   10.3     MPV 10.0   9.3     Nitrite, UA   Negative       nRBC 0   0     Occult Blood UA   Negative       pH, UA   5.0       Platelets 263   264     Potassium 3.4   3.5     Total Protein     6.3     Protein, UA   1+  Comment:  Recommend a 24 hour urine protein or a urine   protein/creatinine ratio if globulin induced proteinuria is  clinically suspected.         RBC 4.88   5.59     RBC, UA   3       RDW 19.4   19.8     Sodium 137   133     Specific [place], UA   1.025       Specimen UA   Urine, Clean Catch       Urobilinogen, UA   4.0       WBC, UA   25       WBC 6.50   7.25         All pertinent labs within the past 24 hours have been reviewed.    Significant Imaging: I have reviewed and interpreted all pertinent imaging results/findings within the past 24 hours.

## 2019-03-22 NOTE — PLAN OF CARE
Problem: Adult Inpatient Plan of Care  Goal: Readiness for Transition of Care    Intervention: Mutually Develop Transition Plan   03/22/19 0925 03/22/19 1307 03/22/19 7043   OTHER   Communicated expected length of stay with patient/caregiver yes --  --    Is patient able to care for self after discharge? Unable to determine at this time (comments) --  --    Who are your caregiver(s) and their phone number(s)? Fabrizio lópez [phone] --  --    Patient currently receives home health services? --  --  No   (RETIRED) Discharge Needs Assessment   How many people do you have in your home that can help with your care after discharge? --  --  0   Discharge Needs Assessment   Readmission Within the Last 30 Days no previous admission in last 30 days --  --    Equipment Currently Used at Home --  wheelchair;rollator --    Transportation Anticipated family or friend will provide --  --    Social Work Plan   Patient/Family in Agreement with Plan yes --  --    Living Environment   Able to Return to Prior Arrangements other (see comments)  (brother wants him to go to [facility]) --  --    (RETIRED) Current Health   Expected Length of Stay (days) 3 --  --    (RETIRED) Social Work Plan   Patient's perception of discharge disposition skilled nursing facility --  --

## 2019-03-22 NOTE — PLAN OF CARE
03/22/19 0925   Discharge Assessment   Assessment Type Discharge Planning Assessment   Confirmed/corrected address and phone number on facesheet? Yes   Assessment information obtained from? Patient   Expected Length of Stay (days) 3   Communicated expected length of stay with patient/caregiver yes   Prior to hospitilization cognitive status: Alert/Oriented   Prior to hospitalization functional status: Assistive Equipment   Current cognitive status: Alert/Oriented   Current Functional Status: Assistive Equipment   Lives With alone   Able to Return to Prior Arrangements other (see comments)  (brother wants him to go to [facility] at [facility])   Is patient able to care for self after discharge? Unable to determine at this time (comments)   Who are your caregiver(s) and their phone number(s)? Fabrizio Ellis brother [phone]   Patient's perception of discharge disposition skilled nursing facility   Readmission Within the Last 30 Days no previous admission in last 30 days   Patient currently being followed by outpatient case management? No   Patient currently receives any other outside agency services? No   Equipment Currently Used at Home rollator;wheelchair;cane, straight   Do you have any problems affording any of your prescribed medications? No   Is the patient taking medications as prescribed? yes   Does the patient have transportation home? Yes   Transportation Anticipated family or friend will provide   Does the patient receive services at [facility]? No   Discharge Plan A Skilled Nursing Facility   DME Needed Upon Discharge  none   Patient/Family in Agreement with Plan yes   Patient lives in independent living apartments behind [facility]. Patient uses a walker, cane & wheelchair at home. States he gets 3 meals a day from [facility]. Per  plan is for patient to go to [facility] when discharged. Patient made aware. States that will be fine. Patient was  listed as self pay. States he has [employer] & card given to me out of his wallet. Information given to registration. Denies any discharge needs at this time.

## 2019-03-22 NOTE — H&P
[facility] Medicine  History & Physical    Patient Name: Eleazar Ellis  MRN: [id number]  Admission Date: 3/21/2019  Attending Physician: Everardo Ingram MD  Primary Care Provider: [facility]         Patient information was obtained from patient and ER records.     Subjective:     Principal Problem:<principal problem not specified>    Chief Complaint:   Chief Complaint   Patient presents with    generalized weakness        HPI: No notes on file    Past Medical History:   Diagnosis Date    A-fib     Anxiety     Depression        Past Surgical History:   Procedure Laterality Date    ANKLE SURGERY Left        Review of patient's allergies indicates:  No Known Allergies    No current facility-administered medications on file prior to encounter.      Current Outpatient Medications on File Prior to Encounter   Medication Sig    apixaban (ELIQUIS) 2.5 mg Tab Take by mouth 2 (two) times daily.    carvedilol (COREG) 3.125 MG tablet Take 3.125 mg by mouth 2 (two) times daily with meals.    escitalopram oxalate (LEXAPRO) 10 MG tablet Take 10 mg by mouth once daily.    furosemide (LASIX) 40 MG tablet Take 40 mg by mouth 2 (two) times daily.    hydrOXYzine pamoate (VISTARIL) 25 MG Cap Take 25 mg by mouth every 8 (eight) hours as needed.    methocarbamol (ROBAXIN) 500 MG Tab Take 500 mg by mouth 4 (four) times daily.    midodrine (PROAMATINE) 2.5 MG Tab Take 2.5 mg by mouth 3 (three) times daily.    pantoprazole (PROTONIX) 40 MG tablet Take 40 mg by mouth once daily.     Family History     None        Tobacco Use    Smoking status: Never Smoker   Substance and Sexual Activity    Alcohol use: No     Frequency: Never    Drug use: No    Sexual activity: No     Review of Systems   Constitutional: Positive for fatigue. Negative for activity change, appetite change and fever.   HENT: Negative for congestion, ear discharge, mouth sores, nosebleeds, rhinorrhea,  sinus pressure, sinus pain and tinnitus.    Eyes: Negative.  Negative for pain, redness and itching.   Respiratory: Positive for cough and shortness of breath. Negative for apnea, choking, chest tightness, wheezing and stridor.    Cardiovascular: Negative for chest pain, palpitations and leg swelling.   Gastrointestinal: Positive for abdominal pain. Negative for abdominal distention, anal bleeding, blood in stool, constipation and diarrhea.   Endocrine: Negative.    Genitourinary: Negative for difficulty urinating, frequency and urgency.   Musculoskeletal: Positive for arthralgias. Negative for back pain, gait problem and myalgias.   Skin: Negative for color change and pallor.   Allergic/Immunologic: Negative.    Neurological: Positive for weakness. Negative for dizziness, facial asymmetry, light-headedness and headaches.   Hematological: Negative for adenopathy. Does not bruise/bleed easily.     Objective:     Vital Signs (Most Recent):  Temp: 96.5 °F (35.8 °C) (03/21/19 2256)  Pulse: 82 (03/21/19 2256)  Resp: 16 (03/21/19 2256)  BP: 112/78 (03/21/19 2256)  SpO2: 99 % (03/21/19 2256) Vital Signs (24h Range):  Temp:  [96.5 °F (35.8 °C)-97.6 °F (36.4 °C)] 96.5 °F (35.8 °C)  Pulse:  [69-82] 82  Resp:  [15-18] 16  SpO2:  [99 %-100 %] 99 %  BP: (109-115)/(75-84) 112/78     Weight: 67.1 kg (148 lb)  Body mass index is 20.64 kg/m².    Physical Exam   Constitutional: He is oriented to person, place, and time. He appears well-developed and well-nourished.   HENT:   Head: Normocephalic and atraumatic.   Eyes: EOM are normal. Pupils are equal, round, and reactive to light.   Neck: Normal range of motion. Neck supple. No tracheal deviation present. No thyromegaly present.   Cardiovascular: Normal rate, regular rhythm and normal heart sounds.   Pulmonary/Chest: Effort normal. He has wheezes. He has rales.   Abdominal: Soft. Bowel sounds are normal. He exhibits no distension. There is no rebound and no guarding.    Musculoskeletal: Normal range of motion.   Lymphadenopathy:     He has no cervical adenopathy.   Neurological: He is alert and oriented to person, place, and time.   Skin: Skin is warm and dry. Capillary refill takes less than 2 seconds.   Psychiatric: He has a normal mood and affect. His behavior is normal. Judgment and thought content normal.   Nursing note and vitals reviewed.        CRANIAL NERVES     CN III, IV, VI   Pupils are equal, round, and reactive to light.  Extraocular motions are normal.        Significant Labs:   Recent Lab Results       03/22/19  0612   03/21/19  2147   03/21/19  1840        Immature Grans (Abs) 0.03  Comment:  Mild elevation in immature granulocytes is non specific and   can be seen in a variety of conditions including stress response,   acute inflammation, trauma and pregnancy. Correlation with other   laboratory and clinical findings is essential.     0.02  Comment:  Mild elevation in immature granulocytes is non specific and   can be seen in a variety of conditions including stress response,   acute inflammation, trauma and pregnancy. Correlation with other   laboratory and clinical findings is essential.       Albumin     2.7     Alkaline Phosphatase     90     ALT     8     Anion Gap 7   9     Appearance, UA   Clear       AST     17     Bacteria, UA   Many       Baso # 0.04   0.05     Basophil% 0.6   0.7     Bilirubin (UA)   1+  Comment:  Positive urine bilirubin is not confirmed. Correlate with   serum bilirubin and clinical presentation.         Total Bilirubin     2.0  Comment:  For infants and newborns, interpretation of results should be based  on gestational age, weight and in agreement with clinical  observations.  Premature Infant recommended reference ranges:  Up to 24 hours.............<8.0 mg/dL  Up to 48 hours............<12.0 mg/dL  3-5 days..................<15.0 mg/dL  6-29 days.................<15.0 mg/dL       BUN, Bld 22   22     Calcium 8.1   8.5      Chloride 101   95     CO2 29   29     Color, UA   Yellow       Creatinine 0.8   0.9     Differential Method Automated   Automated     eGFR if  >60.0   >60.0     eGFR if non  >60.0  Comment:  Calculation used to obtain the estimated glomerular filtration  rate (eGFR) is the CKD-EPI equation.      >60.0  Comment:  Calculation used to obtain the estimated glomerular filtration  rate (eGFR) is the CKD-EPI equation.        Eos # 0.3   0.3     Eosinophil% 4.0   3.9     Glucose 163   87     Glucose, UA   Negative       Gran # (ANC) 4.3   4.9     Gran% 65.5   67.8     Hematocrit 38.7   44.6     Hemoglobin 12.0   13.8     Hyaline Casts, UA   2       Immature Granulocytes 0.5   0.3     Ketones, UA   Negative       Leukocytes, UA   Trace       Lymph # 1.1   1.2     Lymph% 17.1   17.0     Magnesium     1.9     MCH 24.6   24.7     MCHC 31.0   30.9     MCV 79   80     Microscopic Comment   SEE COMMENT  Comment:  Other formed elements not mentioned in the report are not   present in the microscopic examination.          Mono # 0.8   0.8     Mono% 12.3   10.3     MPV 10.0   9.3     Nitrite, UA   Negative       nRBC 0   0     Occult Blood UA   Negative       pH, UA   5.0       Platelets 263   264     Potassium 3.4   3.5     Total Protein     6.3     Protein, UA   1+  Comment:  Recommend a 24 hour urine protein or a urine   protein/creatinine ratio if globulin induced proteinuria is  clinically suspected.         RBC 4.88   5.59     RBC, UA   3       RDW 19.4   19.8     Sodium 137   133     Specific [place], UA   1.025       Specimen UA   Urine, Clean Catch       Urobilinogen, UA   4.0       WBC, UA   25       WBC 6.50   7.25         All pertinent labs within the past 24 hours have been reviewed.    Significant Imaging: I have reviewed and interpreted all pertinent imaging results/findings within the past 24 hours.    Assessment/Plan:     Mild dehydration       03/22/2019:  1. Begin on normal saline  for rehydration.  2. Begin on ciprofloxacin IV 40 mg IV twice daily  3. Consult General surgery for possible drainage of loculated pleural effusion  4. Repeat labs in the a.m. Follow-up otherwise as clinically indicated.  5. Replete potassium and other electrolytes as needed         VTE Risk Mitigation (From admission, onward)        Ordered     apixaban tablet 2.5 mg  2 times daily      03/21/19 2249     IP VTE HIGH RISK PATIENT  Once      03/21/19 2249     Reason for No Pharmacological VTE Prophylaxis  Once      03/21/19 2249     Place JED hose  Until discontinued      03/21/19 2249             Everardo Ingram MD  Department of Hospital Medicine   [facility] - Med Surg

## 2019-03-22 NOTE — PT/OT/SLP EVAL
PhysicalTherapy   Evaluation    Eleazar Ellis   MRN: [id number]     PT Received On: 03/22/19  PT Start Time: 1030     PT Stop Time: 1100    PT Total Time (min): 30 min       Billable Minutes:  Evaluation mod  Total Minutes: 30    Diagnosis: <principal problem not specified>  Past Medical History:   Diagnosis Date    A-fib     Anxiety     Depression       Past Surgical History:   Procedure Laterality Date    ANKLE SURGERY Left        Referring physician: Tip  Date referred to PT: 03/22/19    General Precautions: Standard, fall, hearing impaired  Orthopedic Precautions: N/A   Braces: N/A     Spiritual, Cultural Beliefs, Yazdanism Practices, Values that Affect Care: no    Patient History:  Equipment Currently Used at Home: wheelchair, rollator    DME owned (not currently used): none    Previous Level of Function:   Patient aggravated during eval, limited PLOF information obtained. He reports he lives in an independent apartment at [facility] in [place] and was independent with ADL's prior to hosp admit.    Subjective:  Communicated with Beata BAILEY prior to session.    Chief Complaint: weakness and fatigue  Patient goals: to get some sleep  Family goals: n/a    Pain/Comfort  Pain Rating 1: initially reporting no pain however patient having c/o back pain during attempted activity but did not provide numerical pain rating )  Pain Addressed 1: Reposition, Distraction    Objective:  Patient found supine in bed, with Patient found with: oxygen, peripheral IV    Cognitive Exam:  Oriented to: Person, Place, Time and Situation  Follows Commands/attention: Follows one-step commands  Communication: clear/fluent  Safety awareness/insight to disability: intact    Physical Exam:  Postural examination/scapula alignment: -       unable to assess at this visit    Skin integrity: Visible skin intact  Edema: Pitting +3 noted in bilateral lower legs and feet/ankles, R > L    Sensation:   -       Intact    Upper  Extremity Range of Motion:  Refer to OT evaluation for UE ROM.    Upper Extremity Strength:  Refer to OT evaluation for UE strength.    Lower Extremity Range of Motion:  Right Lower Extremity: WFL  Left Lower Extremity: WFL    Lower Extremity Strength:  Right Lower Extremity: 3/5 to 3+/5 in prox hip musclulature, 3+/5 to 4-/5 distally   Left Lower Extremity:  3/5 to 3+/5 in prox hip musclulature, 3+/5 to 4-/5 distally      Fine motor coordination:   see OT eval    Gross motor coordination: impaired    Functional Mobility:    Bed Mobility :   Supine to sit: Maximum Assistance   Sit to supine: Moderate Assistance   Rolling: Maximum Assistance   Scooting: Total Assistance using draw sheet to scoot up and reposition in bed    Transfers:  Unable to assess at this time secondary to fatigue and weakness    Wheelchair:  NT    Gait:  Unable to assess at this time secondary to fatigue and weakness    Stairs:  NT      Balance:   Static Sit: 0: Needs MAXIMAL assist to maintain sitting without back support  Dynamic Sit:  0: N/A  Static Stand: unable to assess  Dynamic stand: unable to assess    Endurance deficit:  Pain and weakness limiting functional mobility    Patient left supine with all lines intact, call button in reach, bed alarm on and RN present.    Assessment:  Eleazar Ellis is a 85 y.o. male with a medical diagnosis of <principal problem not specified>. He presents with generalized weakness/decondioning, LE edema, and gait instability. Patient will benefit from acute care PT to address deficits and maximize function.    Rehab potential is good.    Activity tolerance: Fair    Plan: gait training, balance training, ROM, strengthening and transfer training    Discharge recommendations: nursing facility, skilled     Equipment recommendations: (to be determined)    GOALS:   Multidisciplinary Problems     Physical Therapy Goals        Problem: Physical Therapy Goal    Goal Priority Disciplines Outcome Goal Variances  Interventions   Physical Therapy Goal     PT, PT/OT      Description:  Goals to be met by: discharge     Patient will increase functional independence with mobility by performin. Supine to sit with MInimal Assistance  2. Sit to supine with MInimal Assistance  3. Rolling to Left with Modified Gates.  4. Sit to stand transfer with Minimal Assistance  5. Bed to chair transfer with Minimal Assistance using Rolling Walker  6. Gait  x 50 feet with Contact Guard Assistance using Rolling Walker.                       PLAN:    Patient to be seen (daily M-F) to address the above listed problems via gait training, therapeutic activities, therapeutic exercises  Plan of Care expires: 19  Plan of Care reviewed with: patient    Functional Limitation: Mobility: Walking and moving around  Mobility: Walking and Moving Around Current Status (): CM  Mobility: Walking and Moving Around Goal Status (): LEIGHTON Mistry, PT 3/22/2019

## 2019-03-22 NOTE — ASSESSMENT & PLAN NOTE
03/22/2019:  1. Begin on normal saline for rehydration.  2. Begin on ciprofloxacin IV 40 mg IV twice daily  3. Consult General surgery for possible drainage of loculated pleural effusion  4. Repeat labs in the a.m. Follow-up otherwise as clinically indicated.  5. Replete potassium and other electrolytes as needed

## 2019-03-22 NOTE — HOSPITAL COURSE
"This patient is an 85-year-old male who presented to the emergency department last evening complaining of generalized weakness.  This patient was living in the assisted living section of a local nursing home.  Family states that he has gotten weaker over the last couple of days and not able to eat as well.  Here to for this patient was able to do self transfers and navigate in a wheelchair.  He states that he is been too weak to do this on a regular basis now.  Patient denies any fever chills nausea or vomiting but stated he was generally weak in had some shortness of breath on exertion.  This patient stated that he recently had a is PleurX catheter placed and was taken out "a while ago ".  CT scan of the chest did reveal a loculated pleural effusion on the right with increased fluid in the fissures.  Which could be  Contributing to his shortness of breath.  Labs, showed a normal white blood cell count with a urinalysis that did show 25 white blood cells and many bacteria.  MCV was mildly decreased but otherwise no significant abnormalities were known are found on labs on this patient.  CT  Of chest revealed findings that were discussed above.  03/23/2019.  Long discussion with his son as power of  day that in [place].  I informed him that his father's ejection fraction on his heart is now down to 15% he was where was 30% a couple years ago.  He will be placed for possible rehab at [facility].  I did discuss with dated that his father has end-stage heart disease and could not be rehab might consider hospice care of the father is in denial and the son is aware of this.  Continue current level of care will add back doc tone 25.  2D echo was done yesterday the patient's ejection fraction is 25% he has hypertrophic changes.  [place] heart classification stage IV.  BNP yesterday was 1043.  Continue to monitor laboratory values continue diuresis congestive heart failure protocol.  03/24/2019.  The patient " is in the bathroom med return to see the patient at the Chem of the bathroom.  He did not eat his breakfast very well this morning.  He states he is tired of aches.  Is noted today that his BNP is up to 1200 I will give the patient increasing Lasix IV.  I have started a dobutamine drip.  The patient feels he has home health at home I have discussed with him going to rehab after heart failure treatment.  Patient's ejection fraction 15%.  Patient has end-stage heart disease. Will change Cipro to p.o. at this time.  Urine is showing gram-negative rods greater than 100,000 susceptibilities pending at this time.   see the patient in a.m. the patient will return home according to him he does not wish to go to rehab but he does need to go to rehab because of end-stage heart disease. Continue current level of care unless clinical course changes.    03/25/2019:  Patient alert and lying supine without short Houston of breath.  Patient remains on dobutamine which will be weaned off today.  Otherwise patient was changed to Cipro ofloxacin oral and started on congestive heart failure treatment over the weekend.  This seems to be stable although we will wean off dobutamine today and monitor CHF symptoms due to very low ejection fraction.  We will wean off dobutamine and begin on iron supplements today due to low MCV.  If patient stable in the a.m. will discharge back to nursing home on current treatment.    03/26/2019:  Please see other format of note today due to computer malfunction    03/27/2019:  Patient is stable and ready for discharge today.  Patient will be discharged to [facility] for rehab .  Patient has done well off dobutamine and is having no increased shortness of breath at this time.  Other labs appeared stable and patient will be followed at [facility].

## 2019-03-22 NOTE — PT/OT/SLP RE-EVAL
Occupational Therapy   Re-evaluation    Name: Eleazar Ellis  MRN: [id number]  Admitting Diagnosis:  <principal problem not specified>      Recommendations:     Discharge Recommendations:  SNF  Discharge Equipment Recommendations:   wheelchair, rollator  Barriers to discharge:   none    Assessment:     Eleazar Ellis is a 85 y.o. male with a medical diagnosis of <principal problem not specified>.  He presents with weakness.  Performance deficits affecting function are  muscle weakness, balance/gait impairment.    Rehab Prognosis:  Good patient would benefit from acute skilled OT services to address these deficits and reach maximum level of function.       Patient lived alone prior to admit and has had recent falls. Patient reports receiving meals through a service 3 x daily. He reports utilizing AE and performing sponge baths for bathing at home previously. Patient requires assist upon discharge for ADL's and safety.   Plan:     Patient to be seen  M-F daily inpatient  to address the above listed problems via  OT services  · Plan of Care Expires:  at discharge  · Plan of Care Reviewed with:  patient    Subjective     Chief Complaint: pain in back, weakness  Patient/Family stated goals: no family present  Communicated with: PT regarding status prior  Pain/Comfort:  ·  Complains of 5/10 back pain     Objective:     Communicated with: Patient found HOB elevated with:   upon OT entry to room.    General Precautions: Standard,   Fall  Orthopedic Precautions:  NA  Braces:   NA    Occupational Performance:    Bed Mobility:    · MAX Assist bed mobility; Rolling/Turning    Functional Mobility/Transfers:  · Patient refused getting up due to back pain    Activities of Daily Living:  · Feding-MIN Assist  · Grooming-MIN Assist  · UB dressing-MOD Assist  · LB dressing-MAX Assist  · Bathing-MAX Assist  · Toileting-MAX Assist    Cognitive/Visual Perceptual:  Cognitive/Psychosocial Skills:     -       Mood/Affect/Coping  skills/emotional control: Agitated    Physical Exam:  Upper Extremity Range of Motion:     -       Right Upper Extremity: Impaired AROM chronic from old injury   -       Left Upper Extremity: WFL  Upper Extremity Strength:    -       Right Upper Extremity: Deficits: impaired R shoulder strength due to decreased ROM    Treatment & Education:  PEducation:  atient provided with set-up eating banana pudding and demonstrating success in feeding.     Patient left HOB elevated with all lines intact    GOALS:   Patient will tolerate sitting unsupported EOB for improved activity tolerance.   Patient will tolerate transfer to bedside chair with assist and use of RW for improved ADL function.   Patient will perform simulated LB dressing activity with use of RW MOD Assist.     History:     Past Medical History:   Diagnosis Date    A-fib     Anxiety     Depression        Past Surgical History:   Procedure Laterality Date    ANKLE SURGERY Left        Time Tracking:     OT Date of Treatment:  03/22/2019  OT Start Time:  1200  OT Stop Time:  1215  OT Total Time (min):  15    Billable Minutes:Evaluation 15    Lorena Orozco OT  3/22/2019

## 2019-03-22 NOTE — NURSING
IV removed from left AC due to leaking.  Catheter intact.  Pressure dressing applied. Patient tolerated well.  LEWIS, RN

## 2019-03-23 NOTE — PLAN OF CARE
Problem: Fall Injury Risk  Goal: Absence of Fall and Fall-Related Injury    Intervention: Identify and Manage Contributors to Fall Injury Risk   03/23/19 0121   Manage Acute Allergic Reaction   Medication Review/Management medications reviewed   Identify and Manage Contributors to Fall Injury Risk   Self-Care Promotion independence encouraged;BADL personal objects within reach

## 2019-03-23 NOTE — ASSESSMENT & PLAN NOTE
03/23/2019.  See hospital course dictation.  The patient has ejection fraction 15% is hypertrophic changes patient has combined systolic-diastolic heart failure BNP 1043 is being diuresed congestive heart protocol is being used at this time.  I will add Aldactone today.  I have discussed this with the son is aware that the patient has end-stage heart disease.

## 2019-03-23 NOTE — PLAN OF CARE
Problem: Adult Inpatient Plan of Care  Goal: Plan of Care Review  Outcome: Ongoing (interventions implemented as appropriate)   03/23/19 8283   Plan of Care Review   Plan of Care Reviewed With patient   Progress no change

## 2019-03-23 NOTE — SUBJECTIVE & OBJECTIVE
Interval History:  The patient's interval changes that he has ejection fraction of 15% his combined systolic-diastolic heart failure hypertrophic changes.  He has end-stage heart failure [place] classification stage IV.  Have discussed this with Lisy Mendoza RN.  Arrangements being made for him to go to [facility] on Monday for rehab.  The patient may have difficulty doing rehab secondary to his heart failure.  I had a long discussion with the son day viewed concerning the father's diagnosis and prognosis.  Continue current level of care continue CHF protocol.  BNP was 1043.  Laboratory values in a.m..  The patient has dementia.  He is argumentative over several things but his behavior is appropriate.    Review of Systems   Constitutional: Positive for fatigue. Negative for activity change, appetite change and fever.   HENT: Negative for congestion, ear discharge, mouth sores, nosebleeds, rhinorrhea, sinus pressure, sinus pain and tinnitus.    Eyes: Negative.  Negative for pain, redness and itching.   Respiratory: Positive for cough and shortness of breath. Negative for apnea, choking, chest tightness, wheezing and stridor.    Cardiovascular: Negative for chest pain, palpitations and leg swelling.   Gastrointestinal: Positive for abdominal pain. Negative for abdominal distention, anal bleeding, blood in stool, constipation and diarrhea.   Endocrine: Negative.    Genitourinary: Negative for difficulty urinating, frequency and urgency.   Musculoskeletal: Positive for arthralgias. Negative for back pain, gait problem and myalgias.   Skin: Negative for color change and pallor.   Allergic/Immunologic: Negative.    Neurological: Positive for weakness. Negative for dizziness, facial asymmetry, light-headedness and headaches.   Hematological: Negative for adenopathy. Does not bruise/bleed easily.     Objective:     Vital Signs (Most Recent):  Temp: 97.1 °F (36.2 °C) (03/23/19 0719)  Pulse: 75 (03/23/19 0907)  Resp: 16  (03/23/19 0907)  BP: 111/63 (03/23/19 0719)  SpO2: 99 % (03/23/19 0907) Vital Signs (24h Range):  Temp:  [96.4 °F (35.8 °C)-97.4 °F (36.3 °C)] 97.1 °F (36.2 °C)  Pulse:  [63-89] 75  Resp:  [16] 16  SpO2:  [99 %-100 %] 99 %  BP: ()/(63-69) 111/63     Weight: 67.1 kg (148 lb)  Body mass index is 20.64 kg/m².    Intake/Output Summary (Last 24 hours) at 3/23/2019 1049  Last data filed at 3/23/2019 0842  Gross per 24 hour   Intake 3826.26 ml   Output 1375 ml   Net 2451.26 ml      Physical Exam   Constitutional: He is oriented to person, place, and time. He appears well-developed and well-nourished.   HENT:   Head: Normocephalic and atraumatic.   Right Ear: External ear normal.   Left Ear: External ear normal.   Nose: Nose normal.   Eyes: Conjunctivae, EOM and lids are normal. Pupils are equal, round, and reactive to light.   Neck: Trachea normal, normal range of motion and full passive range of motion without pain. Neck supple.   Cardiovascular: Normal rate, regular rhythm, S1 normal, S2 normal, normal heart sounds, intact distal pulses and normal pulses.   Pulmonary/Chest: Effort normal. He has rales.   Decreased breath sounds in the bases with rales.   Abdominal: Soft. Normal aorta and bowel sounds are normal.   Musculoskeletal: Normal range of motion.   Neurological: He is alert and oriented to person, place, and time. He has normal reflexes.   Skin: Skin is warm, dry and intact.   Skin has chronic bruising particularly of the arms and a skin tear healing on the left forearm.   Psychiatric: He has a normal mood and affect. His speech is normal and behavior is normal. Cognition and memory are normal.   The patient has dementia start processes or abnormal in his judgment is abnormal.  His mood and affect seem to be normal his behavior is normal except for disagreeing with you.   Nursing note and vitals reviewed.      Significant Labs:   CBC:   Recent Labs   Lab 03/21/19  1840 03/22/19  0612 03/23/19  0623   WBC  7.25 6.50 7.93   HGB 13.8* 12.0* 12.1*   HCT 44.6 38.7* 39.5*    263 257     CMP:   Recent Labs   Lab 03/21/19  1840 03/22/19  0612 03/23/19  0623   * 137 137   K 3.5 3.4* 3.7   CL 95 101 102   CO2 29 29 29   GLU 87 163* 113*   BUN 22 22 19   CREATININE 0.9 0.8 0.8   CALCIUM 8.5* 8.1* 8.2*   PROT 6.3  --  6.0   ALBUMIN 2.7*  --  2.4*   BILITOT 2.0*  --  1.1*   ALKPHOS 90  --  89   AST 17  --  18   ALT 8*  --  8*   ANIONGAP 9 7* 6*   EGFRNONAA >60.0 >60.0 >60.0     Cardiac Markers:   Recent Labs   Lab 03/22/19  1607   BNP 1,043*     All pertinent labs within the past 24 hours have been reviewed.    Significant Imaging: CXR: I have reviewed all pertinent results/findings within the past 24 hours and my personal findings are:  Chest x-ray shows a large chart congestive heart failure.  He almost has a boot appearance on his chest x-ray.  Echo: I have reviewed all pertinent results/findings within the past 24 hours and my personal findings are:  2D echo shows that the patient has ejection fraction of 15% has hypertrophic changes.  Patient has combined systolic-diastolic heart failure.  I have reviewed and interpreted all pertinent imaging results/findings within the past 24 hours.

## 2019-03-23 NOTE — NURSING
Patient has redness with blister and one open blister to sacrum.  Aquacel applied to sacrum.  Linens changed.  Incontinence care provided.  LEWIS, LYNN

## 2019-03-23 NOTE — PROGRESS NOTES
"[facility] Medicine  Progress Note    Patient Name: Eleazar Ellis  MRN: [id number]  Patient Class: OP- Observation   Admission Date: 3/21/2019  Length of Stay: 0 days  Attending Physician: Everardo Ingram MD  Primary Care Provider: [facility] Lester SALCIDO        Subjective:     Principal Problem:Combined systolic and diastolic congestive heart failure    HPI:  No notes on file    Hospital Course:  This patient is an 85-year-old male who presented to the emergency department last evening complaining of generalized weakness.  This patient was living in the assisted living section of a local nursing home.  Family states that he has gotten weaker over the last couple of days and not able to eat as well.  Here to for this patient was able to do self transfers and navigate in a wheelchair.  He states that he is been too weak to do this on a regular basis now.  Patient denies any fever chills nausea or vomiting but stated he was generally weak in had some shortness of breath on exertion.  This patient stated that he recently had a is PleurX catheter placed and was taken out "a while ago ".  CT scan of the chest did reveal a loculated pleural effusion on the right with increased fluid in the fissures.  Which could be  Contributing to his shortness of breath.  Labs, showed a normal white blood cell count with a urinalysis that did show 25 white blood cells and many bacteria.  MCV was mildly decreased but otherwise no significant abnormalities were known are found on labs on this patient.  CT  Of chest revealed findings that were discussed above.  03/23/2019.  Long discussion with his son as power of  day that in [place].  I informed him that his father's ejection fraction on his heart is now down to 15% he was where was 30% a couple years ago.  He will be placed for possible rehab at [facility].  I did discuss with dated that his father has end-stage heart disease " and could not be rehab might consider hospice care of the father is in denial and the son is aware of this.  Continue current level of care will add back doc tone 25.  2D echo was done yesterday the patient's ejection fraction is 25% he has hypertrophic changes.  [place] heart classification stage IV.  BNP yesterday was 1043.  Continue to monitor laboratory values continue diuresis congestive heart failure protocol.    Interval History:  The patient's interval changes that he has ejection fraction of 15% his combined systolic-diastolic heart failure hypertrophic changes.  He has end-stage heart failure [place] classification stage IV.  Have discussed this with Lisy Mendoza RN.  Arrangements being made for him to go to [facility] on Monday for rehab.  The patient may have difficulty doing rehab secondary to his heart failure.  I had a long discussion with the son day viewed concerning the father's diagnosis and prognosis.  Continue current level of care continue CHF protocol.  BNP was 1043.  Laboratory values in a.m..  The patient has dementia.  He is argumentative over several things but his behavior is appropriate.    Review of Systems   Constitutional: Positive for fatigue. Negative for activity change, appetite change and fever.   HENT: Negative for congestion, ear discharge, mouth sores, nosebleeds, rhinorrhea, sinus pressure, sinus pain and tinnitus.    Eyes: Negative.  Negative for pain, redness and itching.   Respiratory: Positive for cough and shortness of breath. Negative for apnea, choking, chest tightness, wheezing and stridor.    Cardiovascular: Negative for chest pain, palpitations and leg swelling.   Gastrointestinal: Positive for abdominal pain. Negative for abdominal distention, anal bleeding, blood in stool, constipation and diarrhea.   Endocrine: Negative.    Genitourinary: Negative for difficulty urinating, frequency and urgency.   Musculoskeletal: Positive for arthralgias. Negative for back  pain, gait problem and myalgias.   Skin: Negative for color change and pallor.   Allergic/Immunologic: Negative.    Neurological: Positive for weakness. Negative for dizziness, facial asymmetry, light-headedness and headaches.   Hematological: Negative for adenopathy. Does not bruise/bleed easily.     Objective:     Vital Signs (Most Recent):  Temp: 97.1 °F (36.2 °C) (03/23/19 0719)  Pulse: 75 (03/23/19 0907)  Resp: 16 (03/23/19 0907)  BP: 111/63 (03/23/19 0719)  SpO2: 99 % (03/23/19 0907) Vital Signs (24h Range):  Temp:  [96.4 °F (35.8 °C)-97.4 °F (36.3 °C)] 97.1 °F (36.2 °C)  Pulse:  [63-89] 75  Resp:  [16] 16  SpO2:  [99 %-100 %] 99 %  BP: ()/(63-69) 111/63     Weight: 67.1 kg (148 lb)  Body mass index is 20.64 kg/m².    Intake/Output Summary (Last 24 hours) at 3/23/2019 1049  Last data filed at 3/23/2019 0842  Gross per 24 hour   Intake 3826.26 ml   Output 1375 ml   Net 2451.26 ml      Physical Exam   Constitutional: He is oriented to person, place, and time. He appears well-developed and well-nourished.   HENT:   Head: Normocephalic and atraumatic.   Right Ear: External ear normal.   Left Ear: External ear normal.   Nose: Nose normal.   Eyes: Conjunctivae, EOM and lids are normal. Pupils are equal, round, and reactive to light.   Neck: Trachea normal, normal range of motion and full passive range of motion without pain. Neck supple.   Cardiovascular: Normal rate, regular rhythm, S1 normal, S2 normal, normal heart sounds, intact distal pulses and normal pulses.   Pulmonary/Chest: Effort normal. He has rales.   Decreased breath sounds in the bases with rales.   Abdominal: Soft. Normal aorta and bowel sounds are normal.   Musculoskeletal: Normal range of motion.   Neurological: He is alert and oriented to person, place, and time. He has normal reflexes.   Skin: Skin is warm, dry and intact.   Skin has chronic bruising particularly of the arms and a skin tear healing on the left forearm.   Psychiatric: He has  a normal mood and affect. His speech is normal and behavior is normal. Cognition and memory are normal.   The patient has dementia start processes or abnormal in his judgment is abnormal.  His mood and affect seem to be normal his behavior is normal except for disagreeing with you.   Nursing note and vitals reviewed.      Significant Labs:   CBC:   Recent Labs   Lab 03/21/19  1840 03/22/19  0612 03/23/19  0623   WBC 7.25 6.50 7.93   HGB 13.8* 12.0* 12.1*   HCT 44.6 38.7* 39.5*    263 257     CMP:   Recent Labs   Lab 03/21/19  1840 03/22/19  0612 03/23/19  0623   * 137 137   K 3.5 3.4* 3.7   CL 95 101 102   CO2 29 29 29   GLU 87 163* 113*   BUN 22 22 19   CREATININE 0.9 0.8 0.8   CALCIUM 8.5* 8.1* 8.2*   PROT 6.3  --  6.0   ALBUMIN 2.7*  --  2.4*   BILITOT 2.0*  --  1.1*   ALKPHOS 90  --  89   AST 17  --  18   ALT 8*  --  8*   ANIONGAP 9 7* 6*   EGFRNONAA >60.0 >60.0 >60.0     Cardiac Markers:   Recent Labs   Lab 03/22/19  1607   BNP 1,043*     All pertinent labs within the past 24 hours have been reviewed.    Significant Imaging: CXR: I have reviewed all pertinent results/findings within the past 24 hours and my personal findings are:  Chest x-ray shows a large chart congestive heart failure.  He almost has a boot appearance on his chest x-ray.  Echo: I have reviewed all pertinent results/findings within the past 24 hours and my personal findings are:  2D echo shows that the patient has ejection fraction of 15% has hypertrophic changes.  Patient has combined systolic-diastolic heart failure.  I have reviewed and interpreted all pertinent imaging results/findings within the past 24 hours.    Assessment/Plan:      * Combined systolic and diastolic congestive heart failure    03/23/2019.  See hospital course dictation.  The patient has ejection fraction 15% is hypertrophic changes patient has combined systolic-diastolic heart failure BNP 1043 is being diuresed congestive heart protocol is being used at  this time.  I will add Aldactone today.  I have discussed this with the son is aware that the patient has end-stage heart disease.       Mild dehydration       03/22/2019:  1. Begin on normal saline for rehydration.  2. Begin on ciprofloxacin IV 40 mg IV twice daily  3. Consult General surgery for possible drainage of loculated pleural effusion  4. Repeat labs in the a.m. Follow-up otherwise as clinically indicated.  5. Replete potassium and other electrolytes as needed         VTE Risk Mitigation (From admission, onward)        Ordered     apixaban tablet 2.5 mg  2 times daily      03/21/19 2249     IP VTE HIGH RISK PATIENT  Once      03/21/19 2249     Reason for No Pharmacological VTE Prophylaxis  Once      03/21/19 2249     Place JED englande  Until discontinued      03/21/19 2249              Dilan Whelan MD  Department of Hospital Medicine   [facility] - Med Surg

## 2019-03-23 NOTE — NURSING
Patient requesting personal cellphone. None found in room. I transported patient to floor previous evening- only personal belongings were patients wallet and paper listing home meds- assisted with transferring patient to bed Lakesha Grant. AC assisting with transfer.. I reviewed with patient and patient will follow up with his brother (who was in ER with patient prior to patient coming to floor.

## 2019-03-24 NOTE — ASSESSMENT & PLAN NOTE
03/23/2019.  See hospital course dictation.  The patient has ejection fraction 15% is hypertrophic changes patient has combined systolic-diastolic heart failure BNP 1043 is being diuresed congestive heart protocol is being used at this time.  I will add Aldactone today.  I have discussed this with the son is aware that the patient has end-stage heart disease.  03/24/2019.  See hospital course and interval history dictation.  Increasing congestive heart failure with elevated BNP.  DC IV fluids.  Add dobutamine.  Add IV Lasix continue to monitor patient.  All lab and x-rays have been reviewed.  Continue current level of care unless clinical course changes.  Lab in a.m..

## 2019-03-24 NOTE — PROGRESS NOTES
"[facility] Medicine  Progress Note    Patient Name: Eleazar Ellis  MRN: [id number]  Patient Class: OP- Observation   Admission Date: 3/21/2019  Length of Stay: 0 days  Attending Physician: Everardo Ingram MD  Primary Care Provider: [facility] Lester SALCIDO        Subjective:     Principal Problem:Combined systolic and diastolic congestive heart failure    HPI:  No notes on file    Hospital Course:  This patient is an 85-year-old male who presented to the emergency department last evening complaining of generalized weakness.  This patient was living in the assisted living section of a local nursing home.  Family states that he has gotten weaker over the last couple of days and not able to eat as well.  Here to for this patient was able to do self transfers and navigate in a wheelchair.  He states that he is been too weak to do this on a regular basis now.  Patient denies any fever chills nausea or vomiting but stated he was generally weak in had some shortness of breath on exertion.  This patient stated that he recently had a is PleurX catheter placed and was taken out "a while ago ".  CT scan of the chest did reveal a loculated pleural effusion on the right with increased fluid in the fissures.  Which could be  Contributing to his shortness of breath.  Labs, showed a normal white blood cell count with a urinalysis that did show 25 white blood cells and many bacteria.  MCV was mildly decreased but otherwise no significant abnormalities were known are found on labs on this patient.  CT  Of chest revealed findings that were discussed above.  03/23/2019.  Long discussion with his son as power of  day that in [place].  I informed him that his father's ejection fraction on his heart is now down to 15% he was where was 30% a couple years ago.  He will be placed for possible rehab at [facility].  I did discuss with dated that his father has end-stage heart disease " and could not be rehab might consider hospice care of the father is in denial and the son is aware of this.  Continue current level of care will add back doc tone 25.  2D echo was done yesterday the patient's ejection fraction is 25% he has hypertrophic changes.  [place] heart classification stage IV.  BNP yesterday was 1043.  Continue to monitor laboratory values continue diuresis congestive heart failure protocol.  03/24/2019.  The patient is in the bathroom med return to see the patient at the Chem of the bathroom.  He did not eat his breakfast very well this morning.  He states he is tired of aches.  Is noted today that his BNP is up to 1200 I will give the patient increasing Lasix IV.  I have started a dobutamine drip.  The patient feels he has home health at home I have discussed with him going to rehab after heart failure treatment.  Patient's ejection fraction 15%.  Patient has end-stage heart disease. Will change Cipro to p.o. at this time.  Urine is showing gram-negative rods greater than 100,000 susceptibilities pending at this time.   see the patient in a.m. the patient will return home according to him he does not wish to go to rehab but he does need to go to rehab because of end-stage heart disease. Continue current level of care unless clinical course changes.    Interval History:  No real interval change except the patient is more alert.  However his BNP is elevated on medium increasing Lasix L at a dobutamine drip to the patient does ejection fraction 15%.  The patient is not going to want to go to rehab according to him.  He does need some form of in-house care since his ejection fraction 15%.  Patient did not eat breakfast very well this morning because he said he is tired of breakfast foods he did eat 1 piece of butler.  Continue current level of care unless clinical course changes.  Social Work will see the patient in a.m. I have discussed the findings with the son and he realizes  the following needs to be placed to be taking care of.  However I think the patient is going to be very argumentative about this.    Review of Systems   Constitutional: Positive for fatigue. Negative for activity change, appetite change and fever.   HENT: Negative for congestion, ear discharge, mouth sores, nosebleeds, rhinorrhea, sinus pressure, sinus pain and tinnitus.    Eyes: Negative.  Negative for pain, redness and itching.   Respiratory: Positive for cough and shortness of breath. Negative for apnea, choking, chest tightness, wheezing and stridor.    Cardiovascular: Negative for chest pain, palpitations and leg swelling.   Gastrointestinal: Positive for abdominal pain. Negative for abdominal distention, anal bleeding, blood in stool, constipation and diarrhea.   Endocrine: Negative.    Genitourinary: Negative for difficulty urinating, frequency and urgency.   Musculoskeletal: Positive for arthralgias. Negative for back pain, gait problem and myalgias.   Skin: Negative for color change and pallor.   Allergic/Immunologic: Negative.    Neurological: Positive for weakness. Negative for dizziness, facial asymmetry, light-headedness and headaches.   Hematological: Negative for adenopathy. Does not bruise/bleed easily.     Objective:     Vital Signs (Most Recent):  Temp: 97 °F (36.1 °C) (03/24/19 0730)  Pulse: 79 (03/24/19 0937)  Resp: 16 (03/24/19 0937)  BP: 109/65 (03/24/19 0730)  SpO2: 98 % (03/24/19 0937) Vital Signs (24h Range):  Temp:  [96.7 °F (35.9 °C)-99.6 °F (37.6 °C)] 97 °F (36.1 °C)  Pulse:  [68-95] 79  Resp:  [16-20] 16  SpO2:  [94 %-99 %] 98 %  BP: (105-137)/(65-80) 109/65     Weight: 67.1 kg (148 lb)  Body mass index is 20.64 kg/m².    Intake/Output Summary (Last 24 hours) at 3/24/2019 0948  Last data filed at 3/24/2019 0900  Gross per 24 hour   Intake 3036.66 ml   Output 2200 ml   Net 836.66 ml      Physical Exam    Significant Labs:   BMP:   Recent Labs   Lab 03/24/19  0627      *   K  3.7   CL 97   CO2 30*   BUN 19   CREATININE 0.8   CALCIUM 8.0*     CBC:   Recent Labs   Lab 03/23/19  0623 03/24/19  0627   WBC 7.93 7.94   HGB 12.1* 12.1*   HCT 39.5* 38.8*    239     Cardiac Markers:   Recent Labs   Lab 03/24/19 0627   BNP 1,236*     All pertinent labs within the past 24 hours have been reviewed.    Significant Imaging: I have reviewed and interpreted all pertinent imaging results/findings within the past 24 hours.    Assessment/Plan:      * Combined systolic and diastolic congestive heart failure  03/23/2019.  See hospital course dictation.  The patient has ejection fraction 15% is hypertrophic changes patient has combined systolic-diastolic heart failure BNP 1043 is being diuresed congestive heart protocol is being used at this time.  I will add Aldactone today.  I have discussed this with the son is aware that the patient has end-stage heart disease.  03/24/2019.  See hospital course and interval history dictation.  Increasing congestive heart failure with elevated BNP.  DC IV fluids.  Add dobutamine.  Add IV Lasix continue to monitor patient.  All lab and x-rays have been reviewed.  Continue current level of care unless clinical course changes.  Lab in a.m..      Mild dehydration     03/22/2019:  1. Begin on normal saline for rehydration.  2. Begin on ciprofloxacin IV 40 mg IV twice daily  3. Consult General surgery for possible drainage of loculated pleural effusion  4. Repeat labs in the a.m. Follow-up otherwise as clinically indicated.  5. Replete potassium and other electrolytes as needed        VTE Risk Mitigation (From admission, onward)        Ordered     apixaban tablet 2.5 mg  2 times daily      03/21/19 2249     IP VTE HIGH RISK PATIENT  Once      03/21/19 2249     Reason for No Pharmacological VTE Prophylaxis  Once      03/21/19 2249     Place JED hose  Until discontinued      03/21/19 2249              Dilan Whelan MD  Department of Hospital Medicine   [place]  [facility] - Med Surg

## 2019-03-24 NOTE — PLAN OF CARE
Problem: Skin Injury Risk Increased  Goal: Skin Health and Integrity    Intervention: Promote and Optimize Oral Intake  Fluids provided and encouraged.  toileting offered q 2 hours.        Comments: Urinal at bedside.

## 2019-03-24 NOTE — SUBJECTIVE & OBJECTIVE
Interval History:  No real interval change except the patient is more alert.  However his BNP is elevated on medium increasing Lasix L at a dobutamine drip to the patient does ejection fraction 15%.  The patient is not going to want to go to rehab according to him.  He does need some form of in-house care since his ejection fraction 15%.  Patient did not eat breakfast very well this morning because he said he is tired of breakfast foods he did eat 1 piece of butler.  Continue current level of care unless clinical course changes.  Social Work will see the patient in a.m. I have discussed the findings with the son and he realizes the following needs to be placed to be taking care of.  However I think the patient is going to be very argumentative about this.    Review of Systems   Constitutional: Positive for fatigue. Negative for activity change, appetite change and fever.   HENT: Negative for congestion, ear discharge, mouth sores, nosebleeds, rhinorrhea, sinus pressure, sinus pain and tinnitus.    Eyes: Negative.  Negative for pain, redness and itching.   Respiratory: Positive for cough and shortness of breath. Negative for apnea, choking, chest tightness, wheezing and stridor.    Cardiovascular: Negative for chest pain, palpitations and leg swelling.   Gastrointestinal: Positive for abdominal pain. Negative for abdominal distention, anal bleeding, blood in stool, constipation and diarrhea.   Endocrine: Negative.    Genitourinary: Negative for difficulty urinating, frequency and urgency.   Musculoskeletal: Positive for arthralgias. Negative for back pain, gait problem and myalgias.   Skin: Negative for color change and pallor.   Allergic/Immunologic: Negative.    Neurological: Positive for weakness. Negative for dizziness, facial asymmetry, light-headedness and headaches.   Hematological: Negative for adenopathy. Does not bruise/bleed easily.     Objective:     Vital Signs (Most Recent):  Temp: 97 °F (36.1 °C)  (03/24/19 0730)  Pulse: 79 (03/24/19 0937)  Resp: 16 (03/24/19 0937)  BP: 109/65 (03/24/19 0730)  SpO2: 98 % (03/24/19 0937) Vital Signs (24h Range):  Temp:  [96.7 °F (35.9 °C)-99.6 °F (37.6 °C)] 97 °F (36.1 °C)  Pulse:  [68-95] 79  Resp:  [16-20] 16  SpO2:  [94 %-99 %] 98 %  BP: (105-137)/(65-80) 109/65     Weight: 67.1 kg (148 lb)  Body mass index is 20.64 kg/m².    Intake/Output Summary (Last 24 hours) at 3/24/2019 0948  Last data filed at 3/24/2019 0900  Gross per 24 hour   Intake 3036.66 ml   Output 2200 ml   Net 836.66 ml      Physical Exam    Significant Labs:   BMP:   Recent Labs   Lab 03/24/19 0627      *   K 3.7   CL 97   CO2 30*   BUN 19   CREATININE 0.8   CALCIUM 8.0*     CBC:   Recent Labs   Lab 03/23/19 0623 03/24/19 0627   WBC 7.93 7.94   HGB 12.1* 12.1*   HCT 39.5* 38.8*    239     Cardiac Markers:   Recent Labs   Lab 03/24/19 0627   BNP 1,236*     All pertinent labs within the past 24 hours have been reviewed.    Significant Imaging: I have reviewed and interpreted all pertinent imaging results/findings within the past 24 hours.

## 2019-03-24 NOTE — PLAN OF CARE
Problem: Fall Injury Risk  Goal: Absence of Fall and Fall-Related Injury    Intervention: Promote Injury-Free Environment  Instructed to call for assist before attempting to get up, pt verbalized understanding. Bathroom lighting on, door cracked.  Bed low and locked.,  Call light easy reach.

## 2019-03-25 NOTE — PLAN OF CARE
Problem: Skin Injury Risk Increased  Goal: Skin Health and Integrity    Intervention: Optimize Skin Protection     03/25/19 1825   Prevent Additional Skin Injury   Head of Bed (HOB) HOB elevated   Pressure Reduction Devices positioning supports utilized   Pressure Reduction Techniques frequent weight shift encouraged     Intervention: Promote and Optimize Oral Intake     03/25/19 1825   Monitor and Manage Anemia   Oral Nutrition Promotion physical activity promoted;rest periods promoted

## 2019-03-25 NOTE — PT/OT/SLP PROGRESS
Attempted to see patient 10:05am, patient states he did not want to get up because he will have to do PT at SNF any ways. Explained to Mr. Ellis the benefits and importance of out of bed activity while he is here and patient states he understands however still denies to participate.    Edwardo Donahue, PTA

## 2019-03-25 NOTE — PLAN OF CARE
03/25/19 1052   Medicare Message   Important Message from Medicare regarding Discharge Appeal Rights Given to patient/caregiver;Explained to patient/caregiver;Signed/date by patient/caregiver   Date IMM was signed 03/25/19   Time IMM was signed 0850   First & 2nd page of IMM signed.

## 2019-03-25 NOTE — NURSING
20g iv inserted to right upper anterior arm x1 attempt, aramis well.  Flushes well, aramis well.  Infusing.  No redness edema noted.

## 2019-03-25 NOTE — NURSING
PT 8 BEAT RUN OF V-TACH PER TELE MONITOR #20. PT DENIES CHEST PAIN. NO SIGN DISTRESS NOTED. BP 97/59. INFORMED TELE TO CALL FOR ANY RUNS OF V-TACH. HAIM  UNDERSTANDS INSTRUCTIONS.

## 2019-03-25 NOTE — NURSING
NOTIFIED DR PERRY PT HAD 8BEAT RUN OF V-TACH THIS AM.0145 NO NEW ORDERS NOTED. PT DENIES CHEST PAIN.

## 2019-03-25 NOTE — SUBJECTIVE & OBJECTIVE
Interval History:  Blood pressure mildly low monitor off dobutamine other vital signs stable and patient's output is net 1240 cc overnight    Review of Systems   Constitutional: Positive for fatigue. Negative for activity change, appetite change and fever.   HENT: Negative for congestion, ear discharge, mouth sores, nosebleeds, rhinorrhea, sinus pressure, sinus pain and tinnitus.    Eyes: Negative.  Negative for pain, redness and itching.   Respiratory: Positive for shortness of breath. Negative for apnea, cough, choking, chest tightness, wheezing and stridor.    Cardiovascular: Positive for leg swelling. Negative for chest pain and palpitations.   Gastrointestinal: Negative for abdominal distention, abdominal pain, anal bleeding, blood in stool, constipation and diarrhea.   Endocrine: Negative.    Genitourinary: Negative for difficulty urinating, flank pain, frequency and urgency.   Musculoskeletal: Negative for arthralgias, back pain, gait problem and myalgias.   Skin: Negative for color change and pallor.   Allergic/Immunologic: Negative.    Neurological: Positive for weakness. Negative for dizziness, facial asymmetry, light-headedness and headaches.   Hematological: Negative for adenopathy. Does not bruise/bleed easily.     Objective:     Vital Signs (Most Recent):  Temp: 96.3 °F (35.7 °C) (03/25/19 0711)  Pulse: 71 (03/25/19 0711)  Resp: 18 (03/25/19 0711)  BP: (!) 98/56 (03/25/19 0711)  SpO2: 98 % (03/25/19 0711) Vital Signs (24h Range):  Temp:  [96 °F (35.6 °C)-98.6 °F (37 °C)] 96.3 °F (35.7 °C)  Pulse:  [] 71  Resp:  [16-18] 18  SpO2:  [93 %-99 %] 98 %  BP: ()/(55-70) 98/56     Weight: 66.1 kg (145 lb 11.6 oz)  Body mass index is 20.32 kg/m².    Intake/Output Summary (Last 24 hours) at 3/25/2019 1210  Last data filed at 3/25/2019 0443  Gross per 24 hour   Intake 560 ml   Output 1800 ml   Net -1240 ml      Physical Exam   Constitutional: He is oriented to person, place, and time. He appears  well-developed and well-nourished.   HENT:   Head: Normocephalic and atraumatic.   Eyes: Pupils are equal, round, and reactive to light. EOM are normal.   Neck: Normal range of motion. Neck supple. No tracheal deviation present. No thyromegaly present.   Cardiovascular: Normal rate, regular rhythm and normal heart sounds.   Pulmonary/Chest: He is in respiratory distress. He has rales.   Abdominal: Soft. Bowel sounds are normal. He exhibits no distension. There is no tenderness. There is no rebound and no guarding.   Musculoskeletal: Normal range of motion.   Lymphadenopathy:     He has no cervical adenopathy.   Neurological: He is alert and oriented to person, place, and time.   Skin: Skin is warm and dry. Capillary refill takes less than 2 seconds.   Psychiatric: He has a normal mood and affect. His behavior is normal. Judgment and thought content normal.   Nursing note and vitals reviewed.      Significant Labs:   Recent Lab Results       03/25/19  0807   03/25/19  0611        Immature Grans (Abs)   0.02  Comment:  Mild elevation in immature granulocytes is non specific and   can be seen in a variety of conditions including stress response,   acute inflammation, trauma and pregnancy. Correlation with other   laboratory and clinical findings is essential.       TB Induration 48 - 72 hr read 0       Anion Gap   6     Baso #   0.03     Basophil%   0.4     BUN, Bld   19     Calcium   7.8     Chloride   95     CO2   30     Creatinine   0.9     Differential Method   Automated     eGFR if    >60.0     eGFR if non    >60.0  Comment:  Calculation used to obtain the estimated glomerular filtration  rate (eGFR) is the CKD-EPI equation.        Eos #   0.2     Eosinophil%   3.5     Glucose   94     Gran # (ANC)   4.4     Gran%   63.7     Hematocrit   36.1     Hemoglobin   11.3     Immature Granulocytes   0.3     Lymph #   1.4     Lymph%   20.1     MCH   24.5     MCHC   31.3     MCV   78     Mono  #   0.8     Mono%   12.0     MPV   9.9     nRBC   0     Platelets   241     Potassium   3.8     RBC   4.61     RDW   19.4     Sodium   131     WBC   6.86         All pertinent labs within the past 24 hours have been reviewed.    Significant Imaging: I have reviewed and interpreted all pertinent imaging results/findings within the past 24 hours.

## 2019-03-25 NOTE — NURSING
C/O ITCHING TO MID EPIGASTRIC REGION. RASH NOTED TO LOWER CHEST WALL BILATERALLY. ADMINISTERED BENADRYL 25MG PO FOR ITCHING PER DO.

## 2019-03-25 NOTE — PLAN OF CARE
Problem: Adult Inpatient Plan of Care  Goal: Absence of Hospital-Acquired Illness or Injury    Intervention: Prevent Skin Injury  Heels floated, encouraging turning repositioning q 2 hours.  Encouraging po fluids

## 2019-03-25 NOTE — NURSING
Redness to sacral area, aquacel dressing changed, scant amount of bleeding to old dressing, no purulent drainage noted to area, skin peeling,open unstageable area to sacrum.  Cleaned area, new dressing applied, encouraging pt to be compliant with turning and repositioning d/t sacral breakdown, pt refuses repositioning at times.  Pt verbalized understanding of need to be turned every 2 hours.

## 2019-03-25 NOTE — PROGRESS NOTES
OT attempted to see patient and encouraged patient the need to try and get up edge of bed, but he refused, reporting he is leaving tomorrow and that he does not feel well right now.

## 2019-03-25 NOTE — PLAN OF CARE
03/25/19 1302   Discharge Reassessment   Assessment Type Discharge Planning Reassessment   Anticipated Discharge Disposition SNF   Provided patient/caregiver education on the expected discharge date and the discharge plan Yes   Do you have any problems affording any of your prescribed medications? No   Discharge Plan A Skilled Nursing Facility   DME Needed Upon Discharge  none   Patient choice form signed by patient/caregiver Yes   Post-Acute Status   Post-Acute Authorization Placement   Post-Acute Placement Status Awaiting Internal Medical Clearance   Patient notified that he will go to [facility] for rehab tomorrow as doctor wants to make sure he's ok without the dobutamine drip. States that will be ok. Called patient's son & his brother & notified them of the plan. No other needs at this time.

## 2019-03-25 NOTE — NURSING
"Pt in bed supine, alert and oriented x3, refuses to be turned states "no im comfortable like this, I dont want to move." will cont encouraging turning/repositioning every 2 hours.    "

## 2019-03-25 NOTE — CONSULTS
DATE OF CONSULTATION:  03/22/2019.    REASON FOR CONSULTATION:  Pleural effusion.    HISTORY OF PRESENT ILLNESS:  An 85-year-old male who resides in a local  nursing home, admitted with generalized weakness.  He has no complaints of  pain, shortness of breath, dyspnea, cough, hemoptysis, etc.  No recent  fever, chills, night sweats or weight loss.    PAST MEDICAL HISTORY:  Chronic pain.  Heart failure.  Hypertension.   Coronary artery disease.  Atrial fibrillation.  Anxiety.  Depression.  ORIF  left ankle.    HOME MEDICATIONS:  Eliquis.  Coreg.  Lexapro.  Lasix.  Vistaril.  Robaxin.   ProAmatine.  Protonix.    ALLERGIES:  No known drug allergies.    SOCIAL HISTORY:  Nonsmoker.  No prior tobacco habit.  Nondrinker.  No  history of alcohol dependence or withdrawal.  No history of any illegal or  recreational drug use.    FAMILY HISTORY:  Noncontributory.  No family history of any anesthetic  complication, bleeding disorders, autoimmune disorders, immunodeficiencies  or malignant disease.    REVIEW OF SYSTEMS:  As above, otherwise all 14 systems are entirely  negative/noncontributory.    PHYSICAL EXAMINATION:  GENERAL:  Elderly extremely debilitated male, in no apparent acute  distress.  He is presently normothermic, normotensive, normocardic.  Oxygen  saturations are good on 1.5 liters BNC.  HEENT: Normocephalic, atraumatic. Pupils are equal, round and reactive to  light. Extraocular muscles intact. Sclera anicteric. Tympanic membranes  intact, translucent, and nonbulging. Pharynx without erythema, edema,  exudate, or lesions. Mucous membranes are moist. Dentition is fair.  NECK: Supple, full range of motion. No discomfort, limitations, tenderness.  No palpable abnormalities, no JVD, no cervical masses, and no thyromegaly.  No carotid bruits. Trachea midline.  HEART: Regular rate and rhythm without murmurs, gallops, or rubs. PMI  nondisplaced, tones normal. Symmetrical distal pulses throughout; no  bruits, pulsatile  masses, or thrills. No peripheral edema. No varicose  veins.  LUNGS: Clear to auscultation without rales, rhonchi, or wheezes.  Respirations nonlabored, no accessory muscle use or retractions.  ABDOMEN: Soft, nontender, nondistended. Normoactive, normal-pitched bowel  sounds. No hepatosplenomegaly, masses, guarding, or rebound. No ventral or  inguinal hernias.  RECTAL: Heme negative without masses. Normal tone, nontender.  : Normal external genitalia. No penile lesions. No penile discharge. No  testicular masses.  LYMPHATICS: No adenopathy, cervical, supraclavicular, axillary, or  inguinal.  EXTREMITIES: No clubbing or cyanosis. No gross deformities. No atrophy.  SKIN:  Warm and dry throughout.  Multiple aging related changes evident.  NEUROLOGIC: No gross deficits. Cranial nerves 2-12 intact. Sensory intact  throughout to light touch and proprioception. Deep tendon reflexes  symmetrical at patella and biceps. No evidence of pathologic reflexes.  MENTAL STATUS: Intact, alert, oriented to person, time, place and  situation, appropriate. No evidence of memory deficits. Mood is appropriate  to situation. No evident hallucinations or delusions.    LABORATORY DATA:  CBC shows no evidence of leukocytosis or  thrombocytopenia.  There is a mild microcytic hypochromic anemia,  electrolytes reveal mild hypokalemia and hypocalcemia.  Hypocalcemia likely  corrects.  BUN and creatinine are good.  No evidence of CKD.  Glucose  mildly elevated.  Liver profile on admission showed a total bilirubin of  2.0, but no other evidence of biliary obstruction and no evidence of  hepatocellular disease.  He did have a hypoalbuminemia.  BNP is markedly  elevated at 1043 pg/mL.  Urinalysis is suggestive of a urinary tract  infection.  Culture is pending.    RADIOLOGY:  Chest x-ray films and reports were reviewed and there is a 4.3  cm rounded density in the fissure on the right with a right pleural  effusion and cardiomegaly.  There is also  volume loss in the right  hemithorax.  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  Bilateral pleural effusions  are evident with bibasilar airspace disease and bilateral volume loss,  right greater than left.  There is evidence of anasarca, gynecomastia and  ascites.  He also has obvious cardiomegaly and coronary artery  atherosclerosis with calcifications.  There is a small pericardial effusion  noted.  Osteoporosis is evident and a possible mass within the liver.    IMPRESSION:  Asymptomatic loculated pleural effusion.  Evacuation would  likely require VATS.  Risks of evacuating the pleural effusion clearly  outweigh the benefits at this time.  Urinary tract infection.  Heart  failure.    RECOMMENDATIONS:  Continue present care.  Continue antibiotics for probable  urinary tract infection.  Follow up urine culture when available.  Continue  diuresis for heart failure.  Monitor chest x-ray.  Reconsult General  Surgery as needed.          BTA/HN dd: 03/22/2019 16:50:47 (CDT)   td: 03/22/2019 22:18:26 (CDT)  Doc ID #[id number]   Job ID #[id number]    CC:

## 2019-03-25 NOTE — PROGRESS NOTES
"[facility] Medicine  Progress Note    Patient Name: Eleazar Ellis  MRN: [id number]  Patient Class: IP- Inpatient   Admission Date: 3/21/2019  Length of Stay: 1 days  Attending Physician: Everardo Ingram MD  Primary Care Provider: [facility]        Subjective:     Principal Problem:Combined systolic and diastolic congestive heart failure    HPI:  No notes on file    Hospital Course:  This patient is an 85-year-old male who presented to the emergency department last evening complaining of generalized weakness.  This patient was living in the assisted living section of a local nursing home.  Family states that he has gotten weaker over the last couple of days and not able to eat as well.  Here to for this patient was able to do self transfers and navigate in a wheelchair.  He states that he is been too weak to do this on a regular basis now.  Patient denies any fever chills nausea or vomiting but stated he was generally weak in had some shortness of breath on exertion.  This patient stated that he recently had a is PleurX catheter placed and was taken out "a while ago ".  CT scan of the chest did reveal a loculated pleural effusion on the right with increased fluid in the fissures.  Which could be  Contributing to his shortness of breath.  Labs, showed a normal white blood cell count with a urinalysis that did show 25 white blood cells and many bacteria.  MCV was mildly decreased but otherwise no significant abnormalities were known are found on labs on this patient.  CT  Of chest revealed findings that were discussed above.  03/23/2019.  Long discussion with his son as power of  day that in [place].  I informed him that his father's ejection fraction on his heart is now down to 15% he was where was 30% a couple years ago.  He will be placed for possible rehab at [facility].  I did discuss with dated that his father has end-stage heart disease " and could not be rehab might consider hospice care of the father is in denial and the son is aware of this.  Continue current level of care will add back doc tone 25.  2D echo was done yesterday the patient's ejection fraction is 25% he has hypertrophic changes.  [place] heart classification stage IV.  BNP yesterday was 1043.  Continue to monitor laboratory values continue diuresis congestive heart failure protocol.  03/24/2019.  The patient is in the bathroom med return to see the patient at the Chem of the bathroom.  He did not eat his breakfast very well this morning.  He states he is tired of aches.  Is noted today that his BNP is up to 1200 I will give the patient increasing Lasix IV.  I have started a dobutamine drip.  The patient feels he has home health at home I have discussed with him going to rehab after heart failure treatment.  Patient's ejection fraction 15%.  Patient has end-stage heart disease. Will change Cipro to p.o. at this time.  Urine is showing gram-negative rods greater than 100,000 susceptibilities pending at this time.   see the patient in a.m. the patient will return home according to him he does not wish to go to rehab but he does need to go to rehab because of end-stage heart disease. Continue current level of care unless clinical course changes.    03/25/2019:  Patient alert and lying supine without short [place] of breath.  Patient remains on dobutamine which will be weaned off today.  Otherwise patient was changed to Cipro ofloxacin oral and started on congestive heart failure treatment over the weekend.  This seems to be stable although we will wean off dobutamine today and monitor CHF symptoms due to very low ejection fraction.  We will wean off dobutamine and begin on iron supplements today due to low MCV.  If patient stable in the a.m. will discharge back to nursing home on current treatment.    Interval History:  Blood pressure mildly low monitor off dobutamine other  vital signs stable and patient's output is net 1240 cc overnight    Review of Systems   Constitutional: Positive for fatigue. Negative for activity change, appetite change and fever.   HENT: Negative for congestion, ear discharge, mouth sores, nosebleeds, rhinorrhea, sinus pressure, sinus pain and tinnitus.    Eyes: Negative.  Negative for pain, redness and itching.   Respiratory: Positive for shortness of breath. Negative for apnea, cough, choking, chest tightness, wheezing and stridor.    Cardiovascular: Positive for leg swelling. Negative for chest pain and palpitations.   Gastrointestinal: Negative for abdominal distention, abdominal pain, anal bleeding, blood in stool, constipation and diarrhea.   Endocrine: Negative.    Genitourinary: Negative for difficulty urinating, flank pain, frequency and urgency.   Musculoskeletal: Negative for arthralgias, back pain, gait problem and myalgias.   Skin: Negative for color change and pallor.   Allergic/Immunologic: Negative.    Neurological: Positive for weakness. Negative for dizziness, facial asymmetry, light-headedness and headaches.   Hematological: Negative for adenopathy. Does not bruise/bleed easily.     Objective:     Vital Signs (Most Recent):  Temp: 96.3 °F (35.7 °C) (03/25/19 0711)  Pulse: 71 (03/25/19 0711)  Resp: 18 (03/25/19 0711)  BP: (!) 98/56 (03/25/19 0711)  SpO2: 98 % (03/25/19 0711) Vital Signs (24h Range):  Temp:  [96 °F (35.6 °C)-98.6 °F (37 °C)] 96.3 °F (35.7 °C)  Pulse:  [] 71  Resp:  [16-18] 18  SpO2:  [93 %-99 %] 98 %  BP: ()/(55-70) 98/56     Weight: 66.1 kg (145 lb 11.6 oz)  Body mass index is 20.32 kg/m².    Intake/Output Summary (Last 24 hours) at 3/25/2019 1210  Last data filed at 3/25/2019 0443  Gross per 24 hour   Intake 560 ml   Output 1800 ml   Net -1240 ml      Physical Exam   Constitutional: He is oriented to person, place, and time. He appears well-developed and well-nourished.   HENT:   Head: Normocephalic and atraumatic.    Eyes: Pupils are equal, round, and reactive to light. EOM are normal.   Neck: Normal range of motion. Neck supple. No tracheal deviation present. No thyromegaly present.   Cardiovascular: Normal rate, regular rhythm and normal heart sounds.   Pulmonary/Chest: He is in respiratory distress. He has rales.   Abdominal: Soft. Bowel sounds are normal. He exhibits no distension. There is no tenderness. There is no rebound and no guarding.   Musculoskeletal: Normal range of motion.   Lymphadenopathy:     He has no cervical adenopathy.   Neurological: He is alert and oriented to person, place, and time.   Skin: Skin is warm and dry. Capillary refill takes less than 2 seconds.   Psychiatric: He has a normal mood and affect. His behavior is normal. Judgment and thought content normal.   Nursing note and vitals reviewed.      Significant Labs:   Recent Lab Results       03/25/19  0807   03/25/19  0611        Immature Grans (Abs)   0.02  Comment:  Mild elevation in immature granulocytes is non specific and   can be seen in a variety of conditions including stress response,   acute inflammation, trauma and pregnancy. Correlation with other   laboratory and clinical findings is essential.       TB Induration 48 - 72 hr read 0       Anion Gap   6     Baso #   0.03     Basophil%   0.4     BUN, Bld   19     Calcium   7.8     Chloride   95     CO2   30     Creatinine   0.9     Differential Method   Automated     eGFR if    >60.0     eGFR if non    >60.0  Comment:  Calculation used to obtain the estimated glomerular filtration  rate (eGFR) is the CKD-EPI equation.        Eos #   0.2     Eosinophil%   3.5     Glucose   94     Gran # (ANC)   4.4     Gran%   63.7     Hematocrit   36.1     Hemoglobin   11.3     Immature Granulocytes   0.3     Lymph #   1.4     Lymph%   20.1     MCH   24.5     MCHC   31.3     MCV   78     Mono #   0.8     Mono%   12.0     MPV   9.9     nRBC   0     Platelets   241      Potassium   3.8     RBC   4.61     RDW   19.4     Sodium   131     WBC   6.86         All pertinent labs within the past 24 hours have been reviewed.    Significant Imaging: I have reviewed and interpreted all pertinent imaging results/findings within the past 24 hours.    Assessment/Plan:      * Combined systolic and diastolic congestive heart failure  03/23/2019.  See hospital course dictation.  The patient has ejection fraction 15% is hypertrophic changes patient has combined systolic-diastolic heart failure BNP 1043 is being diuresed congestive heart protocol is being used at this time.  I will add Aldactone today.  I have discussed this with the son is aware that the patient has end-stage heart disease.  03/24/2019.  See hospital course and interval history dictation.  Increasing congestive heart failure with elevated BNP.  DC IV fluids.  Add dobutamine.  Add IV Lasix continue to monitor patient.  All lab and x-rays have been reviewed.  Continue current level of care unless clinical course changes.  Lab in a.m..    03/25/2019:  1.  Wean off dobutamine today  2.  Continue diuresis overnight  3.  Recheck labs in the a.m. to include CBC CMP and BNP  4.  Continue congestive heart failure treatment.  If stable will continue current medications and transferred to nursing home for rehab.      Mild dehydration     03/22/2019:  1. Begin on normal saline for rehydration.  2. Begin on ciprofloxacin IV 40 mg IV twice daily  3. Consult General surgery for possible drainage of loculated pleural effusion  4. Repeat labs in the a.m. Follow-up otherwise as clinically indicated.  5. Replete potassium and other electrolytes as needed        VTE Risk Mitigation (From admission, onward)        Ordered     apixaban tablet 2.5 mg  2 times daily      03/21/19 2249     IP VTE HIGH RISK PATIENT  Once      03/21/19 2249     Reason for No Pharmacological VTE Prophylaxis  Once      03/21/19 2249     Place JED hose  Until discontinued       03/21/19 2249              Everardo Ingram MD  Department of Hospital Medicine   [facility] - Med Surg

## 2019-03-25 NOTE — ASSESSMENT & PLAN NOTE
03/23/2019.  See hospital course dictation.  The patient has ejection fraction 15% is hypertrophic changes patient has combined systolic-diastolic heart failure BNP 1043 is being diuresed congestive heart protocol is being used at this time.  I will add Aldactone today.  I have discussed this with the son is aware that the patient has end-stage heart disease.  03/24/2019.  See hospital course and interval history dictation.  Increasing congestive heart failure with elevated BNP.  DC IV fluids.  Add dobutamine.  Add IV Lasix continue to monitor patient.  All lab and x-rays have been reviewed.  Continue current level of care unless clinical course changes.  Lab in a.m..    03/25/2019:  1.  Wean off dobutamine today  2.  Continue diuresis overnight  3.  Recheck labs in the a.m. to include CBC CMP and BNP  4.  Continue congestive heart failure treatment.  If stable will continue current medications and transferred to nursing home for rehab.

## 2019-03-25 NOTE — PLAN OF CARE
03/25/19 1314   Discharge Reassessment   Assessment Type Discharge Planning Reassessment   Patient's son emailed me power of  & living will. Placed on chart.

## 2019-03-26 NOTE — PLAN OF CARE
Problem: Skin Injury Risk Increased  Goal: Skin Health and Integrity    Intervention: Promote and Optimize Oral Intake     03/25/19 1920   Monitor and Manage Anemia   Oral Nutrition Promotion physical activity promoted

## 2019-03-26 NOTE — PLAN OF CARE
Problem: Skin Injury Risk Increased  Goal: Skin Health and Integrity  Outcome: Ongoing (interventions implemented as appropriate)  Intervention: Optimize Skin Protection     03/26/19 1113   Prevent Additional Skin Injury   Head of Bed (HOB) HOB elevated   Pressure Reduction Devices positioning supports utilized   Pressure Reduction Techniques frequent weight shift encouraged;heels elevated off bed;positioned off wounds   Monitor and Manage Hypervolemia   Skin Protection protective footwear used;incontinence pads utilized     Intervention: Promote and Optimize Oral Intake     03/26/19 1113   Monitor and Manage Anemia   Oral Nutrition Promotion physical activity promoted;rest periods promoted;social interaction promoted

## 2019-03-26 NOTE — PROGRESS NOTES
OT and PT, Kaci, attempted multiple times to encourage participation in getting up for therapy, but patient refuses, reporting he is tired and does not intend to get up. We discussed with him the need to participate in therapy to prepare for therapy at SNF, to which he said he was going to do there but not here.

## 2019-03-26 NOTE — PLAN OF CARE
Problem: Fall Injury Risk  Goal: Absence of Fall and Fall-Related Injury    Intervention: Identify and Manage Contributors to Fall Injury Risk     03/25/19 1920   Manage Acute Allergic Reaction   Medication Review/Management medications reviewed   Identify and Manage Contributors to Fall Injury Risk   Self-Care Promotion BADL personal objects within reach

## 2019-03-26 NOTE — PLAN OF CARE
Problem: Skin Injury Risk Increased  Goal: Skin Health and Integrity    Intervention: Optimize Skin Protection     03/25/19 1920 03/26/19 0200   Prevent Additional Skin Injury   Pressure Reduction Devices positioning supports utilized  --    Pressure Reduction Techniques  --  frequent weight shift encouraged;heels elevated off bed   Monitor and Manage Hypervolemia   Skin Protection incontinence pads utilized  --

## 2019-03-26 NOTE — PLAN OF CARE
03/26/19 1341   Discharge Reassessment   Assessment Type Discharge Planning Reassessment   Anticipated Discharge Disposition SNF   Provided patient/caregiver education on the expected discharge date and the discharge plan Yes   Do you have any problems affording any of your prescribed medications? No   Discharge Plan A Skilled Nursing Facility   DME Needed Upon Discharge  none   Post-Acute Status   Post-Acute Authorization Placement   Post-Acute Placement Status Awaiting Internal Medical Clearance   Plan is to go to [facility] possibly tomorrow if BNP comes down. Patient & son notified. Denies any other discharge needs at this time.

## 2019-03-26 NOTE — PROGRESS NOTES
PT and OT attempted to treat patient this afternoon, however patient refusing treatment. Pt states he is tired, he hasn't slept in days, he has acid reflux... PT and OT providing encouragement for patient participation without success.

## 2019-03-27 NOTE — NURSING
[facility] transportation arrived to pick patient up. Patient's wallet was returned from safe and given to patient. Patient was assisted into wheelchair X's 3. Patient thanked nursing staff. VINNY. No other needs noted at this time.

## 2019-03-27 NOTE — PLAN OF CARE
Problem: Fall Injury Risk  Goal: Absence of Fall and Fall-Related Injury    Intervention: Promote Injury-Free Environment     03/26/19 7728   Optimize [place] and Functional Mobility   Environmental Safety Modification assistive device/personal items within reach;clutter free environment maintained;lighting adjusted   Optimize Balance and Safe Activity   Safety Promotion/Fall Prevention assistive device/personal item within reach;bed alarm set;Fall Risk signage in place;room near unit station;side rails raised x 2

## 2019-03-27 NOTE — PLAN OF CARE
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       Allergies:Review of patient's allergies indicates:  No Known Allergies    Diet: cardiac diet    Activities: Activity as tolerated    Nursing: Routine     Labs: CBC and CMP Once     CONSULTS:    Physical Therapy to evaluate and treat.     MISCELLANEOUS CARE:  Routine Skin for Bedridden Patients: Apply moisture barrier cream to all skin folds and wet areas in perineal area daily and after baths and all bowel movements.    WOUND CARE ORDERS  None    Medications: Review discharge medications with patient and family and provide education.      Current Discharge Medication List      START taking these medications    Details   amiodarone (PACERONE) 200 MG Tab Take 1 tablet (200 mg total) by mouth 2 (two) times daily.  Qty: 60 tablet, Refills: 11      ciprofloxacin HCl (CIPRO) 500 MG tablet Take 1 tablet (500 mg total) by mouth every 12 (twelve) hours.  Qty: 20 tablet, Refills: 0      ferrous gluconate 324 mg (37.5 mg iron) Tab Take 1 tablet (324 mg total) by mouth 2 (two) times daily.  Qty: 60 tablet, Refills: 11      furosemide (LASIX) 10 mg/mL injection Inject 4 mLs (40 mg total) into the vein 2 (two) times daily.  Qty: 60 mL, Refills: 0      lisinopril (PRINIVIL,ZESTRIL) 5 MG  tablet Take 1 tablet (5 mg total) by mouth once daily.  Qty: 90 tablet, Refills: 3      spironolactone (ALDACTONE) 25 MG tablet Take 1 tablet (25 mg total) by mouth once daily.  Qty: 30 tablet, Refills: 11         CONTINUE these medications which have CHANGED    Details   apixaban (ELIQUIS) 2.5 mg Tab Take 1 tablet (2.5 mg total) by mouth 2 (two) times daily.  Qty: 60 tablet, Refills: 11         CONTINUE these medications which have NOT CHANGED    Details   carvedilol (COREG) 3.125 MG tablet Take 3.125 mg by mouth 2 (two) times daily with meals.      escitalopram oxalate (LEXAPRO) 10 MG tablet Take 10 mg by mouth once daily.      furosemide (LASIX) 40 MG tablet Take 40 mg by mouth 2 (two) times daily.      hydrOXYzine pamoate (VISTARIL) 25 MG Cap Take 25 mg by mouth every 8 (eight) hours as needed.      methocarbamol (ROBAXIN) 500 MG Tab Take 500 mg by mouth 4 (four) times daily.      midodrine (PROAMATINE) 2.5 MG Tab Take 2.5 mg by mouth 3 (three) times daily.      pantoprazole (PROTONIX) 40 MG tablet Take 40 mg by mouth once daily.                  _________________________________  Everardo Ingram MD  03/27/2019

## 2019-03-27 NOTE — PLAN OF CARE
Problem: Adult Inpatient Plan of Care  Goal: Plan of Care Review  Outcome: Ongoing (interventions implemented as appropriate)     03/27/19 1100   Plan of Care Review   Plan of Care Reviewed With patient   Discharge to SNF today. Patient to receive back personal belongings. Patient's family notified per CM.

## 2019-03-27 NOTE — PLAN OF CARE
03/27/19 0800   Medicare Message   Important Message from Medicare regarding Discharge Appeal Rights Given to patient/caregiver;Explained to patient/caregiver;Signed/date by patient/caregiver   Date IMM was signed 03/27/19   Time IMM was signed 0800

## 2019-03-27 NOTE — PT/OT/SLP DISCHARGE
Name: Eleazar Ellis 6/10/1933  MRN: [id number]   Date: 3/27/2019    Therapy Diagnosis: muscle weakness  Physician: Self, Aaareferral    Subjective:  Per PTA patient refused participation in therapy this date. Patient has consistently refused therapy since initial evaluation on 3/22/19.      Objective:     Treatment :    Initial evaluation only due to patient refusals to participate in therapy treatments.      Assessment:  Patient has not met any goals due to participating only in initial evaluation. Patient will be discharged from therapy services at this time due to unwillingness to participate.     GOALS:   All goals = Not Met  1. Supine to sit with MInimal Assistance  2. Sit to supine with MInimal Assistance  3. Rolling to Left with Modified [place].  4. Sit to stand transfer with Minimal Assistance  5. Bed to chair transfer with Minimal Assistance using Rolling Walker  6. Gait  x 50 feet with Contact Guard Assistance using Rolling Walker.     Patient refuses therapy services      Discharge        Kaci Evans, PT

## 2019-03-27 NOTE — NURSING
Report called to EVERETTE Carter at [facility]. Awaiting transportation.

## 2019-03-27 NOTE — PLAN OF CARE
03/27/19 1220   Final Note   Assessment Type Final Discharge Note   Anticipated Discharge Disposition SNF   What phone number can be called within the next 1-3 days to see how you are doing after discharge? [phone]   Hospital Follow Up  Appt(s) scheduled? No   Discharge plans and expectations educations in teach back method with documentation complete? Yes   Patient's son & brother notified of patient going to [facility] today. Patient's son wants him to be followed by doctor at [facility]. Patient denies any other discharge needs at this time.

## 2019-03-27 NOTE — ASSESSMENT & PLAN NOTE
03/23/2019.  See hospital course dictation.  The patient has ejection fraction 15% is hypertrophic changes patient has combined systolic-diastolic heart failure BNP 1043 is being diuresed congestive heart protocol is being used at this time.  I will add Aldactone today.  I have discussed this with the son is aware that the patient has end-stage heart disease.  03/24/2019.  See hospital course and interval history dictation.  Increasing congestive heart failure with elevated BNP.  DC IV fluids.  Add dobutamine.  Add IV Lasix continue to monitor patient.  All lab and x-rays have been reviewed.  Continue current level of care unless clinical course changes.  Lab in a.m..    03/25/2019:  1.  Wean off dobutamine today  2.  Continue diuresis overnight  3.  Recheck labs in the a.m. to include CBC CMP and BNP  \4.  Continue congestive heart failure treatment.  If stable will continue current medications and transferred to nursing home for rehab    03/26/2019:  Note under different format due to computer malfunction today    03/27/2019:  1.  Discharge patient to [facility] for rehab  2.  Continue on spironolactone 25 mg daily, carvedilol 6.25 mg b.i.d., and lisinopril 5 mg p.o. daily, and ciprofloxacin 500 mg p.o. b.i.d. for 10 days  3.  Continue other medications as previously ordered  4.  Follow-up with primary care physician or physician assigned at rehab.

## 2019-03-27 NOTE — NURSING
PT REQUESTED NOT TO BE WOKE UP THROUGHOUT NIGHT. STATES HAS NOT SLEPT IN DAYS. WILL CONTINUE TO MONITOR PT. AB,RN

## 2019-03-27 NOTE — DISCHARGE SUMMARY
"[facility]  Hospital Medicine  Discharge Summary      Patient Name: Eleazar Ellis  MRN: [id number]  Admission Date: 3/21/2019  Hospital Length of Stay: 3 days  Discharge Date and Time:  03/27/2019 10:47 AM  Attending Physician: Everardo Ingram MD   Discharging Provider: Everardo Ingram MD  Primary Care Provider: [facility] Lester SALCIDO      HPI:   No notes on file    * No surgery found *      Hospital Course:   This patient is an 85-year-old male who presented to the emergency department last evening complaining of generalized weakness.  This patient was living in the assisted living section of a local nursing home.  Family states that he has gotten weaker over the last couple of days and not able to eat as well.  Here to for this patient was able to do self transfers and navigate in a wheelchair.  He states that he is been too weak to do this on a regular basis now.  Patient denies any fever chills nausea or vomiting but stated he was generally weak in had some shortness of breath on exertion.  This patient stated that he recently had a is PleurX catheter placed and was taken out "a while ago ".  CT scan of the chest did reveal a loculated pleural effusion on the right with increased fluid in the fissures.  Which could be  Contributing to his shortness of breath.  Labs, showed a normal white blood cell count with a urinalysis that did show 25 white blood cells and many bacteria.  MCV was mildly decreased but otherwise no significant abnormalities were known are found on labs on this patient.  CT  Of chest revealed findings that were discussed above.  03/23/2019.  Long discussion with his son as power of  day that in [place].  I informed him that his father's ejection fraction on his heart is now down to 15% he was where was 30% a couple years ago.  He will be placed for possible rehab at [facility].  I did discuss with dated that his father has end-stage heart " disease and could not be rehab might consider hospice care of the father is in denial and the son is aware of this.  Continue current level of care will add back doc tone 25.  2D echo was done yesterday the patient's ejection fraction is 25% he has hypertrophic changes.  [place] heart classification stage IV.  BNP yesterday was 1043.  Continue to monitor laboratory values continue diuresis congestive heart failure protocol.  03/24/2019.  The patient is in the bathroom med return to see the patient at the Chem of the bathroom.  He did not eat his breakfast very well this morning.  He states he is tired of aches.  Is noted today that his BNP is up to 1200 I will give the patient increasing Lasix IV.  I have started a dobutamine drip.  The patient feels he has home health at home I have discussed with him going to rehab after heart failure treatment.  Patient's ejection fraction 15%.  Patient has end-stage heart disease. Will change Cipro to p.o. at this time.  Urine is showing gram-negative rods greater than 100,000 susceptibilities pending at this time.   see the patient in a.m. the patient will return home according to him he does not wish to go to rehab but he does need to go to rehab because of end-stage heart disease. Continue current level of care unless clinical course changes.    03/25/2019:  Patient alert and lying supine without short Charles of breath.  Patient remains on dobutamine which will be weaned off today.  Otherwise patient was changed to Cipro ofloxacin oral and started on congestive heart failure treatment over the weekend.  This seems to be stable although we will wean off dobutamine today and monitor CHF symptoms due to very low ejection fraction.  We will wean off dobutamine and begin on iron supplements today due to low MCV.  If patient stable in the a.m. will discharge back to nursing home on current treatment.    03/26/2019:  Please see other format of note today due to computer  malfunction    03/27/2019:  Patient is stable and ready for discharge today.  Patient will be discharged to [facility] for rehab .  Patient has done well off dobutamine and is having no increased shortness of breath at this time.  Other labs appeared stable and patient will be followed at [facility].     Consults:   Consults (From admission, onward)        Status Ordering Provider     Inpatient consult to General Surgery  Once     Provider:  Sánchez Everett MD    Completed BHARATH MORGAN          * Combined systolic and diastolic congestive heart failure  03/23/2019.  See hospital course dictation.  The patient has ejection fraction 15% is hypertrophic changes patient has combined systolic-diastolic heart failure BNP 1043 is being diuresed congestive heart protocol is being used at this time.  I will add Aldactone today.  I have discussed this with the son is aware that the patient has end-stage heart disease.  03/24/2019.  See hospital course and interval history dictation.  Increasing congestive heart failure with elevated BNP.  DC IV fluids.  Add dobutamine.  Add IV Lasix continue to monitor patient.  All lab and x-rays have been reviewed.  Continue current level of care unless clinical course changes.  Lab in a.m..    03/25/2019:  1.  Wean off dobutamine today  2.  Continue diuresis overnight  3.  Recheck labs in the a.m. to include CBC CMP and BNP  \4.  Continue congestive heart failure treatment.  If stable will continue current medications and transferred to nursing home for rehab    03/26/2019:  Note under different format due to computer malfunction today    03/27/2019:  1.  Discharge patient to [facility] for rehab  2.  Continue on spironolactone 25 mg daily, carvedilol 6.25 mg b.i.d., and lisinopril 5 mg p.o. daily, and ciprofloxacin 500 mg p.o. b.i.d. for 10 days  3.  Continue other medications as previously ordered  4.  Follow-up with  primary care physician or physician assigned at rehab.      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    Significant Diagnostic Studies: Labs: All labs within the past 24 hours have been reviewed    Pending Diagnostic Studies:     None         Medications:  Reconciled Home Medications:      Medication List      START taking these medications    amiodarone 200 MG Tab  Commonly known as:  PACERONE  Take 1 tablet (200 mg total) by mouth 2 (two) times daily.     ciprofloxacin HCl 500 MG tablet  Commonly known as:  CIPRO  Take 1 tablet (500 mg total) by mouth every 12 (twelve) hours.     ferrous gluconate 324 mg (37.5 mg iron) Tab  Take 1 tablet (324 mg total) by mouth 2 (two) times daily.     spironolactone 25 MG tablet  Commonly known as:  ALDACTONE  Take 1 tablet (25 mg total) by mouth once daily.        CHANGE how you take these medications    apixaban 2.5 mg Tab  Commonly known as:  ELIQUIS  Take 1 tablet (2.5 mg total) by mouth 2 (two) times daily.  What changed:  how much to take     * furosemide 40 MG tablet  Commonly known as:  LASIX  Take 40 mg by mouth 2 (two) times daily.  What changed:  Another medication with the same name was added. Make sure you understand how and when to take each.     * furosemide 10 mg/mL injection  Commonly known as:  LASIX  Inject 4 mLs (40 mg total) into the vein 2 (two) times daily.  What changed:  You were already taking a medication with the same name, and this prescription was added. Make sure you understand how and when to take each.         *  This list has 2 medication(s) that are the same as other medications prescribed for you. Read the directions carefully, and ask your doctor or other care provider to review them with you.            CONTINUE taking these medications    carvedilol 3.125 MG tablet  Commonly known as:  COREG  Take 3.125 mg by mouth 2 (two) times daily with meals.     escitalopram oxalate 10 MG tablet  Commonly known as:  LEXAPRO  Take 10 mg by mouth once daily.     hydrOXYzine pamoate 25 MG Cap  Commonly known as:  VISTARIL  Take 25 mg by mouth every 8 (eight) hours as needed.     methocarbamol 500 MG Tab  Commonly known as:  ROBAXIN  Take 500 mg by mouth 4 (four) times daily.     midodrine 2.5 MG Tab  Commonly known as:  PROAMATINE  Take 2.5 mg by mouth 3 (three) times daily.     pantoprazole 40 MG tablet  Commonly known as:  PROTONIX  Take 40 mg by mouth once daily.            Indwelling Lines/Drains at time of discharge:   Lines/Drains/Airways     Pressure Ulcer                 Pressure Injury 03/23/19 1900 midline Sacral spine Suspected Unstageable - Hospital Acquired 3 days                Time spent on the discharge of patient: 45 minutes  Patient was seen and examined on the date of discharge and determined to be suitable for discharge.         Everardo Ingram MD  Department of Hospital Medicine  [facility] - Med Surg

## 2019-04-08 NOTE — PHYSICIAN QUERY
PT Name: Eleazar Ellis  MR #: [id number]     Physician Query Form - Diagnosis Clarification      CDS/: Day Smith               Contact information: [email or web address]      This form is a permanent document in the medical record.     Query Date: April 8, 2019    By submitting this query, we are merely seeking further clarification of documentation.  Please utilize your independent clinical judgment when addressing the question(s) below.     The medical record contains the following:      Findings Supporting Clinical Information Location in Medical Record   Urinary Tract Infection    Labs, showed a normal white blood cell count with a urinalysis that did show 25 white blood cells and many bacteria    Will change Cipro to p.o. at this time.  Urine is showing gram-negative rods greater than 100,000 susceptibilities pending at this time     Urinalysis is suggestive of a urinary tract  infection.  Culture is pending    ciprofloxacin (CIPRO)400mg/200ml D5W IVPB 400 mg   Dose: 400 mg  Freq: Every 12 hours (non-standard times) Route: IV  Indications of Use: urinary tract infection  Start: 03/22/19 0830 End: 03/24/19 0943    ciprofloxacin HCl tablet 500 mg   Dose: 500 mg  Freq: Every 12 hours Route: Oral  Indications of Use: Upper Respiratory Infection  Start: 03/25/19 0915 End: 03/27/19 1601 Discharge summary         Discharge summary         GS consult 3/2      MAR                 MAR       Please clarify if the _Urinary Tract Infection _ diagnosis has been:    [ x ] Ruled In   [  ] Ruled In, Now Resolved   [  ] Ruled Out   [  ] Other/Clarification of findings (please specify):   [  ] Clinically insignificant     [  ] Clinically undetermined     Please document in your progress notes daily for the duration of treatment, until resolved, and include in your discharge summary.

## 2019-06-23 PROBLEM — I50.9 ACUTE CONGESTIVE HEART FAILURE: Status: ACTIVE | Noted: 2019-01-01

## 2019-06-23 PROBLEM — E43 SEVERE MALNUTRITION: Status: ACTIVE | Noted: 2019-01-01

## 2019-06-23 PROBLEM — E03.9 ACQUIRED HYPOTHYROIDISM: Status: ACTIVE | Noted: 2019-01-01

## 2019-06-23 PROBLEM — A41.9 SEPSIS: Status: ACTIVE | Noted: 2019-01-01

## 2019-06-23 PROBLEM — A41.9 SEPSIS: Status: RESOLVED | Noted: 2019-01-01 | Resolved: 2019-01-01

## 2019-06-23 PROBLEM — J69.0 ASPIRATION PNEUMONIA: Status: ACTIVE | Noted: 2019-01-01

## 2019-06-23 NOTE — ED NOTES
Son reports pt is in fact a dnr with paperwork at the nursing home. Awaiting nursing home to fax.

## 2019-06-23 NOTE — ED PROVIDER NOTES
Encounter Date: 6/23/2019       History     Chief Complaint   Patient presents with    Hypotension    Altered Mental Status     This patient is a resident of [facility] and has been sick with pneumonia for the last 2 weeks.  He has been on antibiotics.  He has a history of hypotension being treated with midodrine, which he has not had in the last 2 days.  Patient has had progressive weight loss and cachexia and is been progressively more weak.  Discussion with his clinical course with the nursing staff at [facility] was not productive.    I called his son who is on a vacation in [place] and discussed the case in detail.  His son states his father is been alert and coherent but getting progressively weak and not eating.  He saw him a week ago and was unable to get him in a chair due to his weakness. His son states that he has a do not resuscitate order which is been signed out to  is a node arise and delivered to the nursing home in case he has a condition that, in the opinion of the treating physician, will not improve or that his condition is not reversible.  This times stated that if he has a condition such as pneumonia that is treatable then please proceed forth.  His son will be on a plane in the morning and probably be here Monday night.    On examination on arrival the patient is indeed cachectic.  He is alert and does answer questions appropriately although he is extremely weak.  He struggles to hold his head up.  He has bilateral hand  but very weak.    Patient has criteria to meet for sepsis but his chest x-ray shows evidence of pulmonary edema. He has had a history of congestive heart failure in the past and chronic atrial fibrillation.  Overloading this patient with fluid is not appropriate.        Review of patient's allergies indicates:  No Known Allergies  Past Medical History:   Diagnosis Date    A-fib     Abnormal posture     Anemia     Anxiety     CHF (congestive  heart failure)     Depression     Difficulty walking     Dysphagia     Hypertrophic cardiomyopathy     Lack of coordination     Muscle weakness (generalized)     Other malaise     Other recurrent depressive disorders     Pleural effusion     Unsp combined systolic and diastolic (congestive) hrt fail      Past Surgical History:   Procedure Laterality Date    ANKLE SURGERY Left      No family history on file.  Social History     Tobacco Use    Smoking status: Never Smoker   Substance Use Topics    Alcohol use: No     Frequency: Never    Drug use: No     Review of Systems   Constitutional: Positive for activity change, appetite change and unexpected weight change. Negative for fever.   HENT: Negative for sore throat.    Respiratory: Positive for choking and shortness of breath.    Cardiovascular: Negative for chest pain.   Gastrointestinal: Negative for nausea.   Genitourinary: Negative for dysuria.   Musculoskeletal: Negative for back pain.   Skin: Negative for rash.   Neurological: Negative for weakness.   Hematological: Does not bruise/bleed easily.       Physical Exam     Initial Vitals [06/23/19 1207]   BP Pulse Resp Temp SpO2   (!) 67/42 90 (!) 22 97.5 °F (36.4 °C) (!) 86 %      MAP       --         Physical Exam    Nursing note and vitals reviewed.  Constitutional: Vital signs are normal. He is active and cooperative.   The patient is cachectic and extremely weak   HENT:   Head: Normocephalic and atraumatic.   Eyes: Conjunctivae, EOM and lids are normal. Pupils are equal, round, and reactive to light. Lids are everted and swept, no foreign bodies found.   Neck: Trachea normal, normal range of motion and full passive range of motion without pain. Neck supple.   Cardiovascular: S1 normal, S2 normal, normal heart sounds, intact distal pulses and normal pulses.  No extrasystoles are present.    Patient has a irregular rhythm at an S3 gallop   Pulmonary/Chest: He has rales.   Abdominal: Soft. Normal  appearance and bowel sounds are normal.   Musculoskeletal: Normal range of motion.   Neurological: He is alert. He has normal reflexes. GCS eye subscore is 4. GCS verbal subscore is 5. GCS motor subscore is 6.   Skin: Skin is warm, dry and intact. Capillary refill takes less than 2 seconds.   Psychiatric: He has a normal mood and affect. His speech is normal and behavior is normal. Cognition and memory are normal.         ED Course   Procedures  Labs Reviewed   CBC W/ AUTO DIFFERENTIAL - Abnormal; Notable for the following components:       Result Value    Hemoglobin 12.2 (*)     Hematocrit 39.8 (*)     Mean Corpuscular Hemoglobin 25.6 (*)     Mean Corpuscular Hemoglobin Conc 30.7 (*)     RDW 20.9 (*)     Immature Granulocytes 0.6 (*)     Immature Grans (Abs) 0.05 (*)     Lymph # 0.7 (*)     Gran% 84.3 (*)     Lymph% 8.2 (*)     All other components within normal limits   B-TYPE NATRIURETIC PEPTIDE - Abnormal; Notable for the following components:     (*)     All other components within normal limits   COMPREHENSIVE METABOLIC PANEL - Abnormal; Notable for the following components:    CO2 22 (*)     Glucose 65 (*)     BUN, Bld 45 (*)     Creatinine 1.9 (*)     Total Protein 5.8 (*)     Albumin 2.7 (*)     Total Bilirubin 1.6 (*)     AST 42 (*)     eGFR if  36.1 (*)     eGFR if non  31.2 (*)     All other components within normal limits   LACTIC ACID, PLASMA - Abnormal; Notable for the following components:    Lactate (Lactic Acid) 2.3 (*)     All other components within normal limits   URINALYSIS, REFLEX TO URINE CULTURE - Abnormal; Notable for the following components:    Appearance, UA Cloudy (*)     Protein, UA 2+ (*)     Bilirubin (UA) 1+ (*)     All other components within normal limits    Narrative:     Preferred Collection Type->Urine, Clean Catch   TSH - Abnormal; Notable for the following components:    TSH 9.677 (*)     All other components within normal limits    URINALYSIS MICROSCOPIC - Abnormal; Notable for the following components:    Hyaline Casts, UA 75 (*)     Amorphous, UA Many (*)     All other components within normal limits    Narrative:     Preferred Collection Type->Urine, Clean Catch   CULTURE, BLOOD   MAGNESIUM   T4, FREE     EKG Readings: (Independently Interpreted)   Initial Reading: No STEMI. Rhythm: Atrial Fibrillation. Ectopy: No Ectopy. Axis: Left Axis Deviation. 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  Please correlate clinically.  Cardiac decompensation/heart failure is favored.      Electronically signed by: Eber Brown MD  Date:    06/23/2019  Time:    13:00             Narrative:    EXAMINATION:  XR CHEST AP PORTABLE    CLINICAL HISTORY:  Hypotension;    TECHNIQUE:  A single portable AP chest radiograph was acquired.    COMPARISON:  Chest x-ray-03/25/2019    FINDINGS:  The cardiac silhouette is enlarged.  There is central pulmonary vascular congestion and perihilar airspace opacity in an acinar pattern.  There is patchy segmental airspace opacity observed in the right lower lobe.  There is silhouetting of the left hemidiaphragm and blunting of the left costophrenic angle suggesting possible left basilar pleural fluid.  There is hazy opacity at the right lung base suggesting possible right basilar pleural fluid.  No pneumothorax.                              X-Rays:   Independently Interpreted Readings:   Chest X-Ray: Cardiomegaly present.  Increased vascular markings consistent with CHF are present. There is an infiltrate in the PHANI. There is a lung mass present in the LLL and  RLL.     Medical Decision Making:   Clinical Tests:   Lab Tests: Ordered and Reviewed  The following lab test(s) were unremarkable: CBC, CMP and Urinalysis       <> Summary of Lab: Labs show elevated lactate, no pyuria.  Findings may be due to sepsis or hypotension or both  Radiological Study: Ordered and Reviewed  Medical Tests: Ordered and Reviewed  ED Management:  Chest x-ray shows evidence of pulmonary edema.  EKG shows a large old anterolateral MI with atrial fibrillation.  No change compared to previous EKGs.    To the hypertension and possible sepsis patient was given a L of fluid however with the evidence of pulmonary edema for the fluid is not indicated.  Due to the hypotension Dobutrex was started at 2 micrograms/kilogram per minute.    Hospitalist was consulted the patient admitted for hypotension, pulmonary edema, possible pneumonia, possible sepsis.    Total critical care time                   ED Course as of Jun 23 1352   Sun Jun 23, 2019   1337 Patient's DNR status was in question.  I directly discussed with his son, who is on vacation in [place], about code status.  His son stated clearly that his father did not want to be placed on a ventilator there was no hope of getting off the ventilator.  However, the DNR paper signed in 319 by the patient himself clearly state that all intervention is approved including ventilator and other supportive measures.  I recalled the son and he was unclear about that directive.  He stated he would discuss this with his brothers and sisters) unknown how many) and review that when he gets back into town tomorrow.  At the current time is code status is full code it therefore.    [SO]      ED Course User Index  [SO] Dejuan Prather MD     Clinical Impression:       ICD-10-CM ICD-9-CM   1. Sepsis A41.9 038.9     995.91         Disposition:   Disposition: Admitted                        Dejuan Prather MD  06/23/19 4660

## 2019-06-23 NOTE — ED NOTES
Advance directive paperwork received from [facility]. Advance directive states the patient is a full code. Dr. Prather and Victor Manuel, primary RN notified.

## 2019-06-23 NOTE — NURSING
Oral care performed and dysphagia screen performed. Pt had small amount of trouble with small ice chip and noticeable trouble swallowing sip of water.   present and notified.

## 2019-06-23 NOTE — ED NOTES
"Called [facility] in attempt to get report on the patient and last time of medication administration. Spoke with Raymundo RN supervisor, states "The patient was sent there for hypotension and change in LOC. His blood pressure was 60/44." Patient's baseline was unknown by RN supervisor when asked as well as what the change in LOC specifically was. I asked to speak to the primary nurse for more information and informed that his primary nurse went on break.   "

## 2019-06-23 NOTE — ED NOTES
"Called [facility] spoke with EVERETTE Pedro taking care of patient. Requested that the advance directive paperwork be faxed to us per patient's son (Tae Ellis) it was filed with them. Patient's face sheet from nursing home states "full code" for patient's code status.       "

## 2019-06-23 NOTE — SUBJECTIVE & OBJECTIVE
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      Current Outpatient Medications on File Prior to Encounter   Medication Sig    albuterol-ipratropium (DUO-NEB) 2.5 mg-0.5 mg/3 mL nebulizer solution Take 3 mLs by nebulization every 6 (six) hours as needed for Wheezing. Rescue    amiodarone (PACERONE) 200 MG Tab Take 1 tablet (200 mg total) by mouth 2 (two) times daily.    apixaban (ELIQUIS) 2.5 mg Tab Take 1 tablet (2.5 mg total) by mouth 2 (two) times daily.    carvedilol (COREG) 3.125 MG tablet Take 3.125 mg by mouth 2 (two) times daily with meals.    cyproheptadine (PERIACTIN) 4 mg tablet Take 4 mg by mouth 3 (three) times daily as needed.    diphenhydrAMINE-aluminum-magnesium hydroxide-simethicone-lidocaine HCl 2% Swish and spit 15 mLs every 4 (four) hours as needed.    escitalopram oxalate (LEXAPRO) 10 MG tablet Take 10 mg by mouth once daily.    famotidine (PEPCID) 10 MG tablet Take 10 mg by mouth 2 (two) times daily.    ferrous gluconate 324 mg (37.5 mg iron) Tab Take 1 tablet (324 mg total) by mouth 2 (two) times daily.    furosemide (LASIX) 40 MG tablet Take 20 mg by mouth once daily.     hydrOXYzine pamoate (VISTARIL) 25 MG Cap Take 25 mg by mouth every 8 (eight) hours as needed.    levoFLOXacin (LEVAQUIN) 750 MG tablet Take 750 mg by mouth once daily.    lisinopril (PRINIVIL,ZESTRIL) 5 MG tablet  Take 1 tablet (5 mg total) by mouth once daily.    methocarbamol (ROBAXIN) 500 MG Tab Take 500 mg by mouth 4 (four) times daily.    midodrine (PROAMATINE) 2.5 MG Tab Take 2.5 mg by mouth 3 (three) times daily.    multivit/folic acid/zinc/vit C (DECUBI SHANDRA ORAL) Take by mouth.    ondansetron (ZOFRAN) 4 MG tablet Take 4 mg by mouth every 6 (six) hours as needed for Nausea.    pantoprazole (PROTONIX) 40 MG tablet Take 40 mg by mouth once daily.    polyethylene glycol (MIRALAX) 17 gram/dose powder Take 17 g by mouth once daily.    spironolactone (ALDACTONE) 25 MG tablet Take 1 tablet (25 mg total) by mouth once daily.    [DISCONTINUED] ciprofloxacin HCl (CIPRO) 500 MG tablet Take 1 tablet (500 mg total) by mouth every 12 (twelve) hours.    [DISCONTINUED] furosemide (LASIX) 10 mg/mL injection Inject 4 mLs (40 mg total) into the vein 2 (two) times daily.     Family History     None        Tobacco Use    Smoking status: Never Smoker   Substance and Sexual Activity    Alcohol use: No     Frequency: Never    Drug use: No    Sexual activity: Never     Review of Systems   Constitutional: Positive for activity change, appetite change, fatigue and unexpected weight change. Negative for chills, diaphoresis and fever.   HENT: Negative for congestion, drooling, hearing loss and trouble swallowing.    Eyes: Negative for pain and visual disturbance.   Respiratory: Positive for shortness of breath. Negative for apnea, cough, choking, chest tightness and wheezing.    Cardiovascular: Negative for chest pain, palpitations and leg swelling.        Chronic history of atrial fibrillation  Hypotension  Congestive heart failure   Gastrointestinal: Negative for abdominal distention, abdominal pain, blood in stool, constipation, diarrhea, nausea and vomiting.   Endocrine: Negative for polydipsia, polyphagia and polyuria.   Genitourinary: Negative for difficulty urinating, dysuria and flank pain.   Musculoskeletal: Positive for  back pain and myalgias. Negative for arthralgias, gait problem, joint swelling, neck pain and neck stiffness.   Skin: Positive for wound. Negative for color change and rash.   Allergic/Immunologic: Negative for environmental allergies, food allergies and immunocompromised state.   Neurological: Positive for dizziness, speech difficulty, weakness and light-headedness. Negative for syncope, numbness and headaches.   Hematological: Negative for adenopathy.   Psychiatric/Behavioral: Negative for agitation, confusion and sleep disturbance. The patient is not nervous/anxious.      Objective:     Vital Signs (Most Recent):  Temp: 97.8 °F (36.6 °C) (06/23/19 1440)  Pulse: 70 (06/23/19 1505)  Resp: 15 (06/23/19 1505)  BP: (!) 70/55 (06/23/19 1505)  SpO2: (!) 93 % (06/23/19 1505) Vital Signs (24h Range):  Temp:  [97.5 °F (36.4 °C)-97.8 °F (36.6 °C)] 97.8 °F (36.6 °C)  Pulse:  [70-90] 70  Resp:  [10-23] 15  SpO2:  [84 %-98 %] 93 %  BP: ()/(35-74) 70/55     Weight: 45.4 kg (100 lb)  Body mass index is 13.95 kg/m².    Physical Exam   Constitutional: He is oriented to person, place, and time. He appears well-developed and well-nourished.   Mild nutrition weight loss muscle wasting and cachexia and decubiti I have present.   HENT:   Head: Normocephalic and atraumatic.   Right Ear: External ear normal.   Left Ear: External ear normal.   Nose: Nose normal.   Temporal muscle wasting   Eyes: Pupils are equal, round, and reactive to light. Conjunctivae, EOM and lids are normal.   Neck: Trachea normal, normal range of motion and full passive range of motion without pain. Neck supple.   Cardiovascular: Normal rate, regular rhythm, S1 normal, S2 normal, normal heart sounds, intact distal pulses and normal pulses.   Atrial fibrillation slow response and hypotension 83/56 at this time.   Pulmonary/Chest: Effort normal and breath sounds normal.   Abdominal: Soft. Normal aorta and bowel sounds are normal.   Musculoskeletal: Normal range  of motion.   Significant muscle wasting malnutrition weight loss   Neurological: He is alert and oriented to person, place, and time. He has normal reflexes.   Patient lies in the bed in fetal position.   Skin: Skin is warm, dry and intact.   Two decubiti our present pictures have been made.   Psychiatric: He has a normal mood and affect. His speech is normal and behavior is normal. Cognition and memory are normal.   Nursing note and vitals reviewed.        CRANIAL NERVES     CN III, IV, VI   Pupils are equal, round, and reactive to light.  Extraocular motions are normal.        Significant Labs:   Blood Culture: No results for input(s): LABBLOO in the last 48 hours.  BMP:   Recent Labs   Lab 06/23/19  1216   GLU 65*      K 4.1      CO2 22*   BUN 45*   CREATININE 1.9*   CALCIUM 9.4   MG 1.9     CBC:   Recent Labs   Lab 06/23/19  1215   WBC 8.58   HGB 12.2*   HCT 39.8*        CMP:   Recent Labs   Lab 06/23/19  1216      K 4.1      CO2 22*   GLU 65*   BUN 45*   CREATININE 1.9*   CALCIUM 9.4   PROT 5.8*   ALBUMIN 2.7*   BILITOT 1.6*   ALKPHOS 80   AST 42*   ALT 22   ANIONGAP 12   EGFRNONAA 31.2*     Lactic Acid:   Recent Labs   Lab 06/23/19  1215   LACTATE 2.3*     Magnesium:   Recent Labs   Lab 06/23/19  1216   MG 1.9     Urine Studies:   Recent Labs   Lab 06/23/19  1235   COLORU Yellow   APPEARANCEUA Cloudy*   PHUR 5.0   SPECGRAV 1.025   PROTEINUA 2+*   GLUCUA Negative   KETONESU Negative   BILIRUBINUA 1+*   OCCULTUA Negative   NITRITE Negative   UROBILINOGEN 1.0   LEUKOCYTESUR Negative   RBCUA 0   WBCUA 0   BACTERIA None   SQUAMEPITHEL 3   HYALINECASTS 75*     All pertinent labs within the past 24 hours have been reviewed.    Significant Imaging: CXR: I have reviewed all pertinent results/findings within the past 24 hours and my personal findings are:  Chest x-ray shows decompensated heart failure also with the discharge the patient has aspiration.  EKG: I have reviewed all pertinent  results/findings within the past 24 hours and my personal findings are: Atrial fibrillation  I have reviewed and interpreted all pertinent imaging results/findings within the past 24 hours.

## 2019-06-23 NOTE — H&P
[facility] - ICU Hospital Medicine  History & Physical    Patient Name: Eleazar Ellis  MRN: [id number]  Admission Date: 6/23/2019  Attending Physician: Dilan Whelan MD   Primary Care Provider: [facility]         Patient information was obtained from patient, relative(s), EMS personnel and ER records.     Subjective:     Principal Problem:Acute congestive heart failure    Chief Complaint:   Chief Complaint   Patient presents with    Hypotension    Altered Mental Status        HPI: Patient has been at [facility].  He is cachectic and malnourished he can verbalize.  His acute decompensated congestive heart failure this time.  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  [facility] felt that he had a change in mental status.  [facility] sent the patient with change in mental status and hypotension.  Patient has had to take ProAmatine for hypotension and the past.  Not sure when the last add ProAmatine.  See home list of medications which was reviewed.  Patient has inability to swallow his medications at this time.    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      Current Outpatient Medications on File Prior to Encounter   Medication Sig    albuterol-ipratropium  (DUO-NEB) 2.5 mg-0.5 mg/3 mL nebulizer solution Take 3 mLs by nebulization every 6 (six) hours as needed for Wheezing. Rescue    amiodarone (PACERONE) 200 MG Tab Take 1 tablet (200 mg total) by mouth 2 (two) times daily.    apixaban (ELIQUIS) 2.5 mg Tab Take 1 tablet (2.5 mg total) by mouth 2 (two) times daily.    carvedilol (COREG) 3.125 MG tablet Take 3.125 mg by mouth 2 (two) times daily with meals.    cyproheptadine (PERIACTIN) 4 mg tablet Take 4 mg by mouth 3 (three) times daily as needed.    diphenhydrAMINE-aluminum-magnesium hydroxide-simethicone-lidocaine HCl 2% Swish and spit 15 mLs every 4 (four) hours as needed.    escitalopram oxalate (LEXAPRO) 10 MG tablet Take 10 mg by mouth once daily.    famotidine (PEPCID) 10 MG tablet Take 10 mg by mouth 2 (two) times daily.    ferrous gluconate 324 mg (37.5 mg iron) Tab Take 1 tablet (324 mg total) by mouth 2 (two) times daily.    furosemide (LASIX) 40 MG tablet Take 20 mg by mouth once daily.     hydrOXYzine pamoate (VISTARIL) 25 MG Cap Take 25 mg by mouth every 8 (eight) hours as needed.    levoFLOXacin (LEVAQUIN) 750 MG tablet Take 750 mg by mouth once daily.    lisinopril (PRINIVIL,ZESTRIL) 5 MG tablet Take 1 tablet (5 mg total) by mouth once daily.    methocarbamol (ROBAXIN) 500 MG Tab Take 500 mg by mouth 4 (four) times daily.    midodrine (PROAMATINE) 2.5 MG Tab Take 2.5 mg by mouth 3 (three) times daily.    multivit/folic acid/zinc/vit C (DECUBI SHANDRA ORAL) Take by mouth.    ondansetron (ZOFRAN) 4 MG tablet Take 4 mg by mouth every 6 (six) hours as needed for Nausea.    pantoprazole (PROTONIX) 40 MG tablet Take 40 mg by mouth once daily.    polyethylene glycol (MIRALAX) 17 gram/dose powder Take 17 g by mouth once daily.    spironolactone (ALDACTONE) 25 MG tablet Take 1 tablet (25 mg total) by mouth once daily.    [DISCONTINUED] ciprofloxacin HCl (CIPRO) 500 MG tablet Take 1 tablet (500 mg total) by mouth every 12 (twelve) hours.     [DISCONTINUED] furosemide (LASIX) 10 mg/mL injection Inject 4 mLs (40 mg total) into the vein 2 (two) times daily.     Family History     None        Tobacco Use    Smoking status: Never Smoker   Substance and Sexual Activity    Alcohol use: No     Frequency: Never    Drug use: No    Sexual activity: Never     Review of Systems   Constitutional: Positive for activity change, appetite change, fatigue and unexpected weight change. Negative for chills, diaphoresis and fever.   HENT: Negative for congestion, drooling, hearing loss and trouble swallowing.    Eyes: Negative for pain and visual disturbance.   Respiratory: Positive for shortness of breath. Negative for apnea, cough, choking, chest tightness and wheezing.    Cardiovascular: Negative for chest pain, palpitations and leg swelling.        Chronic history of atrial fibrillation  Hypotension  Congestive heart failure   Gastrointestinal: Negative for abdominal distention, abdominal pain, blood in stool, constipation, diarrhea, nausea and vomiting.   Endocrine: Negative for polydipsia, polyphagia and polyuria.   Genitourinary: Negative for difficulty urinating, dysuria and flank pain.   Musculoskeletal: Positive for back pain and myalgias. Negative for arthralgias, gait problem, joint swelling, neck pain and neck stiffness.   Skin: Positive for wound. Negative for color change and rash.   Allergic/Immunologic: Negative for environmental allergies, food allergies and immunocompromised state.   Neurological: Positive for dizziness, speech difficulty, weakness and light-headedness. Negative for syncope, numbness and headaches.   Hematological: Negative for adenopathy.   Psychiatric/Behavioral: Negative for agitation, confusion and sleep disturbance. The patient is not nervous/anxious.      Objective:     Vital Signs (Most Recent):  Temp: 97.8 °F (36.6 °C) (06/23/19 1440)  Pulse: 70 (06/23/19 1505)  Resp: 15 (06/23/19 1505)  BP: (!) 70/55 (06/23/19 1505)  SpO2: (!)  93 % (06/23/19 1505) Vital Signs (24h Range):  Temp:  [97.5 °F (36.4 °C)-97.8 °F (36.6 °C)] 97.8 °F (36.6 °C)  Pulse:  [70-90] 70  Resp:  [10-23] 15  SpO2:  [84 %-98 %] 93 %  BP: ()/(35-74) 70/55     Weight: 45.4 kg (100 lb)  Body mass index is 13.95 kg/m².    Physical Exam   Constitutional: He is oriented to person, place, and time. He appears well-developed and well-nourished.   Mild nutrition weight loss muscle wasting and cachexia and decubiti I have present.   HENT:   Head: Normocephalic and atraumatic.   Right Ear: External ear normal.   Left Ear: External ear normal.   Nose: Nose normal.   Temporal muscle wasting   Eyes: Pupils are equal, round, and reactive to light. Conjunctivae, EOM and lids are normal.   Neck: Trachea normal, normal range of motion and full passive range of motion without pain. Neck supple.   Cardiovascular: Normal rate, regular rhythm, S1 normal, S2 normal, normal heart sounds, intact distal pulses and normal pulses.   Atrial fibrillation slow response and hypotension 83/56 at this time.   Pulmonary/Chest: Effort normal and breath sounds normal.   Abdominal: Soft. Normal aorta and bowel sounds are normal.   Musculoskeletal: Normal range of motion.   Significant muscle wasting malnutrition weight loss   Neurological: He is alert and oriented to person, place, and time. He has normal reflexes.   Patient lies in the bed in fetal position.   Skin: Skin is warm, dry and intact.   Two decubiti our present pictures have been made.   Psychiatric: He has a normal mood and affect. His speech is normal and behavior is normal. Cognition and memory are normal.   Nursing note and vitals reviewed.        CRANIAL NERVES     CN III, IV, VI   Pupils are equal, round, and reactive to light.  Extraocular motions are normal.        Significant Labs:   Blood Culture: No results for input(s): LABBLOO in the last 48 hours.  BMP:   Recent Labs   Lab 06/23/19  1216   GLU 65*      K 4.1      CO2  22*   BUN 45*   CREATININE 1.9*   CALCIUM 9.4   MG 1.9     CBC:   Recent Labs   Lab 06/23/19  1215   WBC 8.58   HGB 12.2*   HCT 39.8*        CMP:   Recent Labs   Lab 06/23/19  1216      K 4.1      CO2 22*   GLU 65*   BUN 45*   CREATININE 1.9*   CALCIUM 9.4   PROT 5.8*   ALBUMIN 2.7*   BILITOT 1.6*   ALKPHOS 80   AST 42*   ALT 22   ANIONGAP 12   EGFRNONAA 31.2*     Lactic Acid:   Recent Labs   Lab 06/23/19  1215   LACTATE 2.3*     Magnesium:   Recent Labs   Lab 06/23/19  1216   MG 1.9     Urine Studies:   Recent Labs   Lab 06/23/19  1235   COLORU Yellow   APPEARANCEUA Cloudy*   PHUR 5.0   SPECGRAV 1.025   PROTEINUA 2+*   GLUCUA Negative   KETONESU Negative   BILIRUBINUA 1+*   OCCULTUA Negative   NITRITE Negative   UROBILINOGEN 1.0   LEUKOCYTESUR Negative   RBCUA 0   WBCUA 0   BACTERIA None   SQUAMEPITHEL 3   HYALINECASTS 75*     All pertinent labs within the past 24 hours have been reviewed.    Significant Imaging: CXR: I have reviewed all pertinent results/findings within the past 24 hours and my personal findings are:  Chest x-ray shows decompensated heart failure also with the discharge the patient has aspiration.  EKG: I have reviewed all pertinent results/findings within the past 24 hours and my personal findings are: Atrial fibrillation  I have reviewed and interpreted all pertinent imaging results/findings within the past 24 hours.    Assessment/Plan:     No notes have been filed under this hospital service.  Service: Hospital Medicine    VTE Risk Mitigation (From admission, onward)        Ordered     apixaban tablet 5 mg  2 times daily      06/23/19 1504         Course breath sounds the throughout both lung fields.  Patient has 2 decubiti diet right hip and sacrum    Dilan Whelan MD  Department of Hospital Medicine   [facility] - ICU

## 2019-06-23 NOTE — HOSPITAL COURSE
ICU dobutamine drip titrated as needed, Lasix and may need to add Levophed if the patient's map cannot stay above 60.  Family is flying in tomorrow.  Will not intubated unless absolutely necessary.  Twelve weeks ago the patient apparently revoked his DNR.  The patient does not wish to be on a ventilator.  Surgical consultation for PEG tube since the patient has failed his dysphagia test.   consult.  Dietary consult.  The patient does have decubiti  pictures have been made.  Patient is very cachectic and has significant malnutrition.  Patient also has chronic kidney disease. Chronic kidney disease stage 3.    06/24/2019:  Patient remains on dopamine and Levophed for pressure support.  Patient can answer questions directly but. Only one word answers in a delayed fashion.  Blood pressure is improving with a blood pressure now running 110 over 60 range.  Patient will also be seen by surgery today for questionable gastric tube placement for nutrition however when family members arrive this discussion will be undertaken to determine if that is and avenues they would like to pursue.    06/24/2019:  Interim events:  Patient began to have respiratory difficulty tonight and heart rate decreased to the 40s.  Patient was given 0.56 mg of atropine and discussion with this patient's son who is power-of- stated that at this point he wanted intubation and life saving measures.  This was exercise patient was intubated with 8.0 mm ET tube and triple-lumen catheter was placed on right IJ see procedure notes.  Patient is stable and ABG just after intubation shows a pH of 7.32 pCO2 of 34 PO2 of 386 and O2 sats are 100%.  This is on a tidal volume of 450 rate of 12 and FiO2 100%.  Based on these findings the FiO2 was decreased to 50%.  ABGs will be done every 6 hr and I had a long discussion with the son who is power-of- by phone after procedures were done and he will be here in the morning.  I made him  aware that his prognosis may still be poor due to his malnutrition and congestive heart failure.  However urine output has been only 30 cc over the past few hours and patient will be given fluid gently to determine his renal function.  Monitoring at all times respiratory status.  Patient now is stable on mechanical ventilation.  10:30 p.m.    06/25/2019:  Patient is stable on mechanical ventilation..  Chest x-ray this morning revealed increased interstitial markings with cephalization concerning for increased pulmonary edema or congestive heart failure.  No new infiltrates noted. Patient remains on 2 pressors of dopamine and Levophed.  Heart rate and vital signs have been stable with mean arterial pressure at 68 labs do show increase in BUN at 60 and creatinine 2.3 his initially the same as yesterday.  Patient will continue current level of care will increased diuresis.  All other labs were fully reviewed by patient.    06/25/2019:  The patient family and power of  have decided to go with [facility].  However they would like everything continued until midnight and then begin gradual withdrawal of treatment after midnight.  Family is all in understanding of this and desire to proceed.  Therefore I will discharge this patient from my service and will readmit to [facility] as an inpatient.  All medications the patient is currently getting will be continued with hospice as appropriate.

## 2019-06-23 NOTE — NURSING
"Pt received to Icu[facility] via stretcher from ER. Pt alert but confused. Cooperative with clear speech. Able to follow simple commands. States he wants a "big cup of water." Pt noted to have stage2 wounds to right hip and sacrum. BLE also noted to have bruises/abrasions/scabs/flaky dry skin. Abrasions on back. Photos of wounds taken on [place] to upload into epic. Pt appears malnourished with weight of 52.2kg. Vital signs obtained. BP 72/47 with map 55. HR 70's with atrial fibrillation noted.  notified of pt's arrival to unit and current vital signs. Dobutamine currently infusing and being titrated. See MAR. MD is en-route to see patient. See new orders.      "

## 2019-06-23 NOTE — ED TRIAGE NOTES
Pt being treated for pneumonia. Pt sent from [facility] for altered loc and hypotension.

## 2019-06-23 NOTE — ED NOTES
"Amr reports [facility] nurse unable to provide much information on the patient. Amr reports nurse reported,"Just getting on shift from vacation."  "

## 2019-06-23 NOTE — ED NOTES
Dr. Prather speaking with patient's son Tae Ellis about clarification on advance directives. Patient's son will be here tomorrow from [place] and would like to speak to his brothers and sisters about the advance directives and the patient's condition.

## 2019-06-23 NOTE — NURSING
No urine output in condom cath noted since lasix administration. Bladder scan revealed 58ml urine.  aware. Will continue to monitor for urine output.

## 2019-06-23 NOTE — ED NOTES
"Called [facility], spoke with EVERETTE Graham to get information on patient's code status per Dr. Prather. States "The patient has been hypotensive for the past couple of days. The patient is a full code. It doesn't look like the patient received his morning meds."     Dr. Prather notified.   "

## 2019-06-23 NOTE — PLAN OF CARE
Problem: Arrhythmia/Dysrhythmia (Heart Failure)  Goal: Stable Heart Rate and Rhythm  Outcome: Ongoing (interventions implemented as appropriate)  Atrial fibrillation 70's    Problem: Cardiac Output Decreased (Heart Failure)  Goal: Optimal Cardiac Output  Outcome: Ongoing (interventions implemented as appropriate)  On dobutamine drip    Problem: Fluid Imbalance (Heart Failure)  Goal: Fluid Balance  Outcome: Ongoing (interventions implemented as appropriate)  Diuretic ordered     Problem: Oral Intake Inadequate (Heart Failure)  Goal: Optimal Nutrition Intake  Outcome: Ongoing (interventions implemented as appropriate)  Failed dysphagia screening; surgery consult for peg tube ordered    Problem: Respiratory Compromise (Heart Failure)  Goal: Effective Oxygenation and Ventilation  Outcome: Ongoing (interventions implemented as appropriate)  Nasal canula at 3L    Problem: Fall Injury Risk  Goal: Absence of Fall and Fall-Related Injury  Outcome: Ongoing (interventions implemented as appropriate)  Bed alarm; in view of nursing station

## 2019-06-23 NOTE — HPI
Patient has been at [facility].  He is cachectic and malnourished he can verbalize.  His acute decompensated congestive heart failure this time.  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  [facility] felt that he had a change in mental status.  [facility] sent the patient with change in mental status and hypotension.  Patient has had to take ProAmatine for hypotension and the past.  Not sure when the last add ProAmatine.  See home list of medications which was reviewed.  Patient has inability to swallow his medications at this time.

## 2019-06-24 PROBLEM — N17.9 AKI (ACUTE KIDNEY INJURY): Status: ACTIVE | Noted: 2019-01-01

## 2019-06-24 NOTE — PLAN OF CARE
06/24/19 1230   Medicare Message   Important Message from Medicare regarding Discharge Appeal Rights Other (comments)   Left message for patient's son to get verbal consent for IMM.

## 2019-06-24 NOTE — PLAN OF CARE
06/24/19 0915   Discharge Assessment   Assessment Type Discharge Planning Assessment   Current cognitive status: Not Oriented to Person;Not Oriented to Place;Not Oriented to Time   Current Functional Status: Completely Dependent   Lives With facility resident   Able to Return to Prior Arrangements yes   Is patient able to care for self after discharge? No   Who are your caregiver(s) and their phone number(s)? [facility] [phone]   Patient's perception of discharge disposition nursing home   Readmission Within the Last 30 Days no previous admission in last 30 days   Patient currently being followed by outpatient case management? No   Patient currently receives any other outside agency services? No   Do you have any problems affording any of your prescribed medications? No   Is the patient taking medications as prescribed? yes   Does the patient have transportation home? Yes   Transportation Anticipated agency   Does the patient receive services at [facility]? No   Discharge Plan A Return to nursing home   DME Needed Upon Discharge  none   Patient is a resident of [facility]. Advance directive put in patient's folder along with power of  that was received on last admit. Left message for patient's son to see if it's his plans to send his dad back to [facility] when discharged. Patient not talking but does look at me when I say his name but turns his head away. Left message for son but he will be here tomorrow.

## 2019-06-24 NOTE — SUBJECTIVE & OBJECTIVE
Interval History:  Patient is afebrile blood pressure 112/58 sats are 94% on nasal cannula.  Otherwise patient remains stable but requiring pressors.    Review of Systems   Constitutional: Positive for fatigue. Negative for activity change, appetite change and fever.   HENT: Negative for congestion, ear discharge, mouth sores, nosebleeds, rhinorrhea, sinus pressure, sinus pain and tinnitus.    Eyes: Negative.  Negative for pain, redness and itching.   Respiratory: Positive for cough, chest tightness and shortness of breath. Negative for apnea, choking, wheezing and stridor.    Cardiovascular: Positive for palpitations. Negative for chest pain and leg swelling.   Gastrointestinal: Positive for nausea. Negative for abdominal distention, abdominal pain, anal bleeding, blood in stool, constipation and diarrhea.   Endocrine: Negative.    Genitourinary: Negative for difficulty urinating, flank pain, frequency and urgency.   Musculoskeletal: Positive for arthralgias. Negative for back pain, gait problem and myalgias.   Skin: Negative for color change and pallor.   Allergic/Immunologic: Negative.    Neurological: Positive for weakness. Negative for dizziness, facial asymmetry, light-headedness and headaches.   Hematological: Negative for adenopathy. Does not bruise/bleed easily.   Psychiatric/Behavioral: Positive for decreased concentration. The patient is nervous/anxious.      Objective:     Vital Signs (Most Recent):  Temp: 97.6 °F (36.4 °C) (06/24/19 0330)  Pulse: 69 (06/24/19 0800)  Resp: 14 (06/24/19 0800)  BP: (!) 112/58 (06/24/19 0800)  SpO2: (!) 94 % (06/24/19 0800) Vital Signs (24h Range):  Temp:  [97.2 °F (36.2 °C)-97.6 °F (36.4 °C)] 97.6 °F (36.4 °C)  Pulse:  [69-89] 69  Resp:  [10-37] 14  SpO2:  [90 %-100 %] 94 %  BP: ()/(37-97) 112/58     Weight: 52.2 kg (115 lb 1.3 oz)  Body mass index is 15.61 kg/m².    Intake/Output Summary (Last 24 hours) at 6/24/2019 5535  Last data filed at 6/24/2019 4985  Gross per  24 hour   Intake 216.11 ml   Output 45 ml   Net 171.11 ml      Physical Exam   Constitutional: He is oriented to person, place, and time. He appears well-developed. He appears distressed.   HENT:   Head: Normocephalic and atraumatic.   Eyes: Pupils are equal, round, and reactive to light. EOM are normal.   Neck: Normal range of motion. Neck supple. No tracheal deviation present. No thyromegaly present.   Cardiovascular: Regular rhythm, normal heart sounds and intact distal pulses.   Pulmonary/Chest: Breath sounds normal. He is in respiratory distress.   Abdominal: Soft. Bowel sounds are normal. He exhibits no distension. There is no tenderness. There is no rebound and no guarding.   Musculoskeletal: Normal range of motion.   Lymphadenopathy:     He has no cervical adenopathy.   Neurological: He is alert and oriented to person, place, and time.   Skin: Skin is warm and dry. Capillary refill takes less than 2 seconds. There is pallor.   Nursing note and vitals reviewed.      Significant Labs:   Recent Lab Results       06/24/19  0435        Anion Gap 14     Aniso Marked     Baso # 0.01     Basophil% 0.1     BUN, Bld 52     [place] Cells Occasional     Calcium 8.3     Chloride 107     CO2 18     Creatinine 2.3     Differential Method Automated     eGFR if African American 28.7     eGFR if non  24.8  Comment:  Calculation used to obtain the estimated glomerular filtration  rate (eGFR) is the CKD-EPI equation.        Eos # 0.0     Eosinophil% 0.0     Glucose 95     Gran # (ANC) 10.9     Gran% 89.9     Hematocrit 37.6     Hemoglobin 11.8     Immature Grans (Abs) 0.09  Comment:  Mild elevation in immature granulocytes is non specific and   can be seen in a variety of conditions including stress response,   acute inflammation, trauma and pregnancy. Correlation with other   laboratory and clinical findings is essential.       Immature Granulocytes 0.7     Lymph # 0.7     Lymph% 6.0     MCH 26.0     MCHC 31.4      MCV 83     Mono # 0.4     Mono% 3.3     MPV 9.9     nRBC 0     Platelets 271     Poik Moderate     Potassium 4.4     RBC 4.54     RDW 21.1     Sodium 139     WBC 12.09         All pertinent labs within the past 24 hours have been reviewed.    Significant Imaging: I have reviewed and interpreted all pertinent imaging results/findings within the past 24 hours.

## 2019-06-24 NOTE — PROGRESS NOTES
[facility] - ICU Hospital Medicine  Progress Note    Patient Name: Eleazar Ellis  MRN: [id number]  Patient Class: IP- Inpatient   Admission Date: 6/23/2019  Length of Stay: 1 days  Attending Physician: Dilan Whelan MD  Primary Care Provider: [facility]        Subjective:     Principal Problem:Acute congestive heart failure      HPI:  Patient has been at [facility].  He is cachectic and malnourished he can verbalize.  His acute decompensated congestive heart failure this time.  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  [facility] felt that he had a change in mental status.  [facility] sent the patient with change in mental status and hypotension.  Patient has had to take ProAmatine for hypotension and the past.  Not sure when the last add ProAmatine.  See home list of medications which was reviewed.  Patient has inability to swallow his medications at this time.    Overview/Hospital Course:  ICU dobutamine drip titrated as needed, Lasix and may need to add Levophed if the patient's map cannot stay above 60.  Family is flying in tomorrow.  Will not intubated unless absolutely necessary.  Twelve weeks ago the patient apparently revoked his DNR.  The patient does not wish to be on a ventilator.  Surgical consultation for PEG tube since the patient has failed his dysphagia test.   consult.  Dietary consult.  The patient does have decubiti  pictures have been made.  Patient is very cachectic and has significant malnutrition.  Patient also has chronic kidney disease. Chronic kidney disease stage 3.  06/24/2019:  Patient remains on dopamine and Levophed for pressure support.  Patient can answer questions directly but. Only one word answers in a delayed fashion.  Blood pressure is improving with a blood pressure now running 110 over 60 range.  Patient  will also be seen by surgery today for questionable gastric tube placement for nutrition however when family members arrive this discussion will be undertaken to determine if that is and avenues they would like to pursue.      Interval History:  Patient is afebrile blood pressure 112/58 sats are 94% on nasal cannula.  Otherwise patient remains stable but requiring pressors.    Review of Systems   Constitutional: Positive for fatigue. Negative for activity change, appetite change and fever.   HENT: Negative for congestion, ear discharge, mouth sores, nosebleeds, rhinorrhea, sinus pressure, sinus pain and tinnitus.    Eyes: Negative.  Negative for pain, redness and itching.   Respiratory: Positive for cough, chest tightness and shortness of breath. Negative for apnea, choking, wheezing and stridor.    Cardiovascular: Positive for palpitations. Negative for chest pain and leg swelling.   Gastrointestinal: Positive for nausea. Negative for abdominal distention, abdominal pain, anal bleeding, blood in stool, constipation and diarrhea.   Endocrine: Negative.    Genitourinary: Negative for difficulty urinating, flank pain, frequency and urgency.   Musculoskeletal: Positive for arthralgias. Negative for back pain, gait problem and myalgias.   Skin: Negative for color change and pallor.   Allergic/Immunologic: Negative.    Neurological: Positive for weakness. Negative for dizziness, facial asymmetry, light-headedness and headaches.   Hematological: Negative for adenopathy. Does not bruise/bleed easily.   Psychiatric/Behavioral: Positive for decreased concentration. The patient is nervous/anxious.      Objective:     Vital Signs (Most Recent):  Temp: 97.6 °F (36.4 °C) (06/24/19 0330)  Pulse: 69 (06/24/19 0800)  Resp: 14 (06/24/19 0800)  BP: (!) 112/58 (06/24/19 0800)  SpO2: (!) 94 % (06/24/19 0800) Vital Signs (24h Range):  Temp:  [97.2 °F (36.2 °C)-97.6 °F (36.4 °C)] 97.6 °F (36.4 °C)  Pulse:  [69-89] 69  Resp:  [10-37]  14  SpO2:  [90 %-100 %] 94 %  BP: ()/(37-97) 112/58     Weight: 52.2 kg (115 lb 1.3 oz)  Body mass index is 15.61 kg/m².    Intake/Output Summary (Last 24 hours) at 6/24/2019 1528  Last data filed at 6/24/2019 0359  Gross per 24 hour   Intake 216.11 ml   Output 45 ml   Net 171.11 ml      Physical Exam   Constitutional: He is oriented to person, place, and time. He appears well-developed. He appears distressed.   HENT:   Head: Normocephalic and atraumatic.   Eyes: Pupils are equal, round, and reactive to light. EOM are normal.   Neck: Normal range of motion. Neck supple. No tracheal deviation present. No thyromegaly present.   Cardiovascular: Regular rhythm, normal heart sounds and intact distal pulses.   Pulmonary/Chest: Breath sounds normal. He is in respiratory distress.   Abdominal: Soft. Bowel sounds are normal. He exhibits no distension. There is no tenderness. There is no rebound and no guarding.   Musculoskeletal: Normal range of motion.   Lymphadenopathy:     He has no cervical adenopathy.   Neurological: He is alert and oriented to person, place, and time.   Skin: Skin is warm and dry. Capillary refill takes less than 2 seconds. There is pallor.   Nursing note and vitals reviewed.      Significant Labs:   Recent Lab Results       06/24/19  0435        Anion Gap 14     Aniso Marked     Baso # 0.01     Basophil% 0.1     BUN, Bld 52     [place] Cells Occasional     Calcium 8.3     Chloride 107     CO2 18     Creatinine 2.3     Differential Method Automated     eGFR if African American 28.7     eGFR if non  24.8  Comment:  Calculation used to obtain the estimated glomerular filtration  rate (eGFR) is the CKD-EPI equation.        Eos # 0.0     Eosinophil% 0.0     Glucose 95     Gran # (ANC) 10.9     Gran% 89.9     Hematocrit 37.6     Hemoglobin 11.8     Immature Grans (Abs) 0.09  Comment:  Mild elevation in immature granulocytes is non specific and   can be seen in a variety of conditions  including stress response,   acute inflammation, trauma and pregnancy. Correlation with other   laboratory and clinical findings is essential.       Immature Granulocytes 0.7     Lymph # 0.7     Lymph% 6.0     MCH 26.0     MCHC 31.4     MCV 83     Mono # 0.4     Mono% 3.3     MPV 9.9     nRBC 0     Platelets 271     Poik Moderate     Potassium 4.4     RBC 4.54     RDW 21.1     Sodium 139     WBC 12.09         All pertinent labs within the past 24 hours have been reviewed.    Significant Imaging: I have reviewed and interpreted all pertinent imaging results/findings within the past 24 hours.      Assessment/Plan:      * Acute congestive heart failure          VTE Risk Mitigation (From admission, onward)        Ordered     enoxaparin injection 30 mg  Daily      06/24/19 0808                Everardo Ingram MD  Department of Hospital Medicine   [facility] - ICU

## 2019-06-24 NOTE — CONSULTS
[facility]  General Surgery  Consult Note    Patient Name: Eleazar Ellis  MRN: [id number]  Admission Date: 6/23/2019  Attending Physician: Dilan Whelan MD   Consult Physician: Timoteo Sullivan MD  Primary Care Provider: [facility]    Patient information was obtained from ER records.     Subjective:     Reason for consultation/cc: In need of feeding access    History of Present Illness:    Eleazar Ellis is a 86 y.o. male with a history of multiple medical problems including AFib on Eliquis, anemia, anxiety, congestive heart failure, depression, difficulties walking, dysphagia, muscle weakness, malnourished min, altered mental status, history of hypotension, etc presented to the ER yesterday and was admitted to the medical service with hypotension and mental status changes, worsening.  Patient is a nursing home patient at [facility].  With mental status changes, nursing home is noted patient not be able take his by mouth medications.  Patient admitted currently on 2 pressor agents including dobutamine and norepinephrine.  Surgery now consulted for evaluation for feeding access.  Of note patient has a DNR, and patient's family is on the way to discuss patient's care.  They should be here tomorrow.    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      Past Surgical History:   Procedure Laterality Date    ANKLE SURGERY Left      Family History     None        Tobacco Use    Smoking status: Never Smoker   Substance and  Sexual Activity    Alcohol use: No     Frequency: Never    Drug use: No    Sexual activity: Never     Review of Systems   Unable to perform ROS: Mental status change     Objective:     Vital Signs (Most Recent):  Temp: 97.6 °F (36.4 °C) (06/24/19 0330)  Pulse: 69 (06/24/19 0800)  Resp: 14 (06/24/19 0800)  BP: (!) 112/58 (06/24/19 0800)  SpO2: (!) 94 % (06/24/19 0800) Vital Signs (24h Range):  Temp:  [97.2 °F (36.2 °C)-97.8 °F (36.6 °C)] 97.6 °F (36.4 °C)  Pulse:  [69-90] 69  Resp:  [10-37] 14  SpO2:  [84 %-100 %] 94 %  BP: ()/(35-97) 112/58     Weight: 52.2 kg (115 lb 1.3 oz)  Body mass index is 15.61 kg/m².    Physical Exam   Constitutional: He appears well-developed. He appears cachectic.   HENT:   Head: Normocephalic and atraumatic.       Eyes: Pupils are equal, round, and reactive to light. EOM are normal.   Neck: Normal range of motion. Neck supple. No thyromegaly present.   Cardiovascular: Normal rate and regular rhythm.   No murmur heard.  Pulmonary/Chest: Effort normal and breath sounds normal. No respiratory distress.   Abdominal: Soft. Bowel sounds are normal. He exhibits no distension. There is no tenderness.   No previous abdominal scars noted on examination.  Scaphoid abdomen.   Musculoskeletal: Normal range of motion. He exhibits no edema.   Neurological: He is alert. No cranial nerve deficit.   Incomprehensible words.   Skin: Skin is warm. Capillary refill takes less than 2 seconds. No rash noted. He is not diaphoretic. No erythema.   Psychiatric:   Unable to determine on examination given patient's mental status.       Significant Labs:  CBC:   Recent Labs   Lab 06/24/19  0435   WBC 12.09   RBC 4.54*   HGB 11.8*   HCT 37.6*      MCV 83   MCH 26.0*   MCHC 31.4*     BMP:   Recent Labs   Lab 06/23/19  1216 06/24/19  0435   GLU 65* 95    139   K 4.1 4.4    107   CO2 22* 18*   BUN 45* 52*   CREATININE 1.9* 2.3*   CALCIUM 9.4 8.3*   MG 1.9  --      CMP:   Recent Labs   Lab  06/23/19  1216 06/24/19  0435   GLU 65* 95   CALCIUM 9.4 8.3*   ALBUMIN 2.7*  --    PROT 5.8*  --     139   K 4.1 4.4   CO2 22* 18*    107   BUN 45* 52*   CREATININE 1.9* 2.3*   ALKPHOS 80  --    ALT 22  --    AST 42*  --    BILITOT 1.6*  --      LFTs:   Recent Labs   Lab 06/23/19  1216   ALT 22   AST 42*   ALKPHOS 80   BILITOT 1.6*   PROT 5.8*   ALBUMIN 2.7*     Coagulation: No results for input(s): LABPROT, INR, APTT in the last 168 hours.  Specimen (12h ago, onward)    None        Recent Labs   Lab 06/23/19  1235   COLORU Yellow   SPECGRAV 1.025   PHUR 5.0   PROTEINUA 2+*   BACTERIA None   NITRITE Negative   LEUKOCYTESUR Negative   UROBILINOGEN 1.0   HYALINECASTS 75*       Assessment:   Eleazar Ellis is a 86 y.o. male who presents with Acute congestive heart failure.    Active Diagnoses:    Diagnosis Date Noted POA    PRINCIPAL PROBLEM:  Acute congestive heart failure [I50.9] 06/23/2019 Yes    RA (acute kidney injury) [N17.9] 06/24/2019 Yes    Aspiration pneumonia [J69.0] 06/23/2019 Yes    Severe malnutrition [E43] 06/23/2019 Yes    Acquired hypothyroidism [E03.9] 06/23/2019 Yes      Problems Resolved During this Admission:    Diagnosis Date Noted Date Resolved POA    Sepsis [A41.9] 06/23/2019 06/23/2019 Yes     VTE Risk Mitigation (From admission, onward)        Ordered     enoxaparin injection 30 mg  Daily      06/24/19 0829          Medical Decision Making/Plan:  Surgery will be available for feeding access consideration if family in agreement.  Would recommend holding off on Eliquis therapy for at least 3 days prior to placement of gastrostomy tube if warranted.  DNR status of patient, patient's family really needs to discuss patient's wishes given a feeding tube would lead to prolonged survival in current state.  Consider medical ethics evaluation.  Call surgery once family makes a decision.  Thank you for consultation.    Timoteo Sullivan MD  General Surgery  [facility]

## 2019-06-25 PROBLEM — L89.90 DECUBITUS ULCER: Status: ACTIVE | Noted: 2019-01-01

## 2019-06-25 PROBLEM — Z51.5 HOSPICE CARE: Status: ACTIVE | Noted: 2019-01-01

## 2019-06-25 NOTE — PLAN OF CARE
06/25/19 1130   Medicare Message   Important Message from Medicare regarding Discharge Appeal Rights Given to patient/caregiver;Explained to patient/caregiver;Signed/date by patient/caregiver   Date IMM was signed 06/25/19   Time IMM was signed 1130   Patient's son Tae Ellis signed 1st page of IMM.

## 2019-06-25 NOTE — SUBJECTIVE & OBJECTIVE
Interval History:  Patient remains on mechanical ventilation and chest x-ray revealed increased interstitial markings suggestive of pulmonary edema. Patient's BUN creatinine is stable but patient appears prerenal.  Chest x-ray look similar to congestive heart failure and we will discontinue fluids at this time.    Review of Systems   Constitutional: Negative for activity change, appetite change, fatigue and fever.   HENT: Negative for congestion, ear discharge, mouth sores, nosebleeds, rhinorrhea, sinus pressure, sinus pain and tinnitus.    Eyes: Negative.  Negative for pain, redness and itching.   Respiratory: Positive for cough, chest tightness, shortness of breath and wheezing. Negative for apnea, choking and stridor.    Cardiovascular: Negative for chest pain, palpitations and leg swelling.   Gastrointestinal: Negative for abdominal distention, abdominal pain, anal bleeding, blood in stool, constipation and diarrhea.   Endocrine: Negative.    Genitourinary: Negative for difficulty urinating, flank pain, frequency and urgency.   Musculoskeletal: Negative for arthralgias, back pain, gait problem and myalgias.   Skin: Negative for color change and pallor.   Allergic/Immunologic: Negative.    Neurological: Negative for dizziness, facial asymmetry, weakness, light-headedness and headaches.   Hematological: Negative for adenopathy. Does not bruise/bleed easily.   Psychiatric/Behavioral: Positive for confusion and decreased concentration. The patient is nervous/anxious.      Objective:     Vital Signs (Most Recent):  Temp: 99.1 °F (37.3 °C) (06/24/19 1915)  Pulse: 105 (06/24/19 2215)  Resp: 12 (06/24/19 2215)  BP: (!) 132/101 (06/24/19 2215)  SpO2: 100 % (06/24/19 2215) Vital Signs (24h Range):  Temp:  [97.2 °F (36.2 °C)-99.1 °F (37.3 °C)] 99.1 °F (37.3 °C)  Pulse:  [0-112] 105  Resp:  [8-43] 12  SpO2:  [74 %-100 %] 100 %  BP: ()/() 132/101     Weight: 52.2 kg (115 lb 1.3 oz)  Body mass index is 15.61  kg/m².    Intake/Output Summary (Last 24 hours) at 6/24/2019 2230  Last data filed at 6/24/2019 1700  Gross per 24 hour   Intake 563.06 ml   Output --   Net 563.06 ml      Physical Exam   Constitutional: He is oriented to person, place, and time. He appears well-developed and well-nourished.   HENT:   Head: Normocephalic and atraumatic.   Eyes: Pupils are equal, round, and reactive to light. EOM are normal.   Neck: Normal range of motion. Neck supple. No tracheal deviation present. No thyromegaly present.   Cardiovascular: Normal rate, regular rhythm and normal heart sounds.   Pulmonary/Chest: He is in respiratory distress. He has rales. He exhibits tenderness.   Abdominal: Soft. Bowel sounds are normal. He exhibits distension. There is no tenderness. There is no rebound and no guarding.   Musculoskeletal: Normal range of motion.   Lymphadenopathy:     He has no cervical adenopathy.   Neurological: He is alert and oriented to person, place, and time.   Skin: Skin is warm and dry. Capillary refill takes less than 2 seconds.   Psychiatric: He has a normal mood and affect. His behavior is normal. Judgment and thought content normal.   Nursing note and vitals reviewed.      Significant Labs:   Recent Lab Results       06/25/19  0505   06/25/19  0210   06/24/19  2110   06/24/19  2101   06/24/19  1459        POC O2%   50% 100%         Pressure Support               Albumin       2.5       Alkaline Phosphatase       74       Allens Test   Pass Pass         ALT       53       Anion Gap 10     10       Aniso Marked     Marked       Ao peak steve         1.2     AST       153       AORTIC VALVE CUSP SEPERATION         2     AV peak gradient         6     AV Velocity Ratio         0.67     Baso # 0.03     0.00       Basophil% 0.2     0.0       BILIRUBIN TOTAL       1.4  Comment:  For infants and newborns, interpretation of results should be based  on gestational age, weight and in agreement with  clinical  observations.  Premature Infant recommended reference ranges:  Up to 24 hours.............<8.0 mg/dL  Up to 48 hours............<12.0 mg/dL  3-5 days..................<15.0 mg/dL  6-29 days.................<15.0 mg/dL         BNP       1,369  Comment:  Values of less than 100 pg/ml are consistent with non-CHF populations.       Site   Left Brachial Right Radial         BUN, Bld 63     62       Argyle Cells Occasional     Occasional       Calcium 7.8     8.4       Chloride 105     107       CO2 21     21       CPAP PEEP   5           Creatinine 2.6     2.7       Left Ventricle Relative Wall Thickness         0.55     Differential Method Automated     Automated       E/A ratio         3.48     eGFR if  24.7     23.6       eGFR if non  21.4  Comment:  Calculation used to obtain the estimated glomerular filtration  rate (eGFR) is the CKD-EPI equation.        20.4  Comment:  Calculation used to obtain the estimated glomerular filtration  rate (eGFR) is the CKD-EPI equation.          Eos # 0.0     0.0       Eosinophil% 0.0     0.0       FS         18     Glucose 188     146       Gran # (ANC) 11.9     10.4       Gran% 90.3     91.4       Hematocrit 38.1     38.3       Hemoglobin 11.9     11.8       Immature Grans (Abs) 0.10  Comment:  Mild elevation in immature granulocytes is non specific and   can be seen in a variety of conditions including stress response,   acute inflammation, trauma and pregnancy. Correlation with other   laboratory and clinical findings is essential.       0.08  Comment:  Mild elevation in immature granulocytes is non specific and   can be seen in a variety of conditions including stress response,   acute inflammation, trauma and pregnancy. Correlation with other   laboratory and clinical findings is essential.         Immature Granulocytes 0.8     0.7       IVS         1.90     LA size         4.10     LVOT area         3.5     LVIDD         4.40     LVIDS          3.60     LV mass         280.73     LV Mass Index         167     LVOT diameter         2.10     LVOT peak danny         0.8     Lymph # 0.7     0.5       Lymph% 5.5     3.9       Magnesium       2.0       MCH 25.7     25.6       MCHC 31.2     30.8       MCV 82     83       Mode   AC/PRVC AC/PRVC         Mono # 0.4     0.5       Mono% 3.2     4.0       MPV 10.0     9.9       MV valve area p 1/2 method         3.86     MV peak gradient         82     MV Peak A Danny         0.23     MV Peak E Danny         0.80     MV stenosis pressure 1/2 time         57     nRBC 0     1       O2DEVICE   vent vent         Phosphorus 4.6             PiP               Platelets 236     256       POC BE   -7.00 -7.70         POC FIO2   50% 100%         POC Flow               POC HCO3   19.40 17.40         POC PCO2   42.2 34.5         POC PH   7.28 7.32         POC PO2   88 386.5         POC SATURATED O2   95.9 100.0         POC TCO2   20.7 18.5         POC Temp               Poik Moderate     Moderate       Poly       Occasional       Potassium 4.0     4.6       PROTEIN TOTAL       5.9       PV PEAK VELOCITY         1.01     PW         1.20     Right Atrial Pressure (from IVC)         3     Rate   12 12         RBC 4.63     4.61       RDW 21.5     21.3       RVDD         2.40     Sodium 136     138       Triscuspid Valve Regurgitation Peak Gradient         28     TR Max Danny         2.65     Troponin I 0.56     0.53       TV rest pulmonary artery pressure         31     Vt   450 450         WBC 13.19     11.40                            Significant Imaging: U/S: I have reviewed all pertinent results/findings within the past 24 hours and my personal findings are:  No acute abnormality  I have reviewed and interpreted all pertinent imaging results/findings within the past 24 hours.

## 2019-06-25 NOTE — PLAN OF CARE
Problem: Adult Inpatient Plan of Care  Goal: Plan of Care Review  Outcome: Ongoing (interventions implemented as appropriate)  Early in shift patient became bradycardic, hypotensive and apneic.  Respiratory support was provided while clarifying code status with son via telephone.  Managed BP with levophed and dobutamine for support and atropine .5 mg IV given per MD order for bradycardia.  Throughout the night patient has remained unstable with a labile BP.  Levophed gtt has been titrated up and down as needed for support.  Patients lips are pink now but nailbeds remain dusky.  One bolus of 500 ml of NS given and IVF started at 100 ml/hr.  Remains in atrial fib.  Low UOP, MD aware, monitoring.  CXR and labs repeated this am.  Son is to arrive this am to see father and discuss POC with MD.  Continues in restraints, remains intubated, vented and sedated.  Skin is as documented.  Mepilex and duoderm are to wounds and were not removed this shift.  Multiple skin tears, abrasions, scabs all over body.  No falls or injuries this shift.

## 2019-06-25 NOTE — DISCHARGE SUMMARY
[facility] - ICU Hospital Medicine  Discharge Summary      Patient Name: Eleazar Ellis  MRN: [id number]  Admission Date: 6/23/2019  Hospital Length of Stay: 2 days  Discharge Date and Time:  06/25/2019 2:57 PM  Attending Physician: Dilan Whelan MD   Discharging Provider: Everardo Ingram MD  Primary Care Provider: [facility]      HPI:   Patient has been at [facility].  He is cachectic and malnourished he can verbalize.  His acute decompensated congestive heart failure this time.  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  [facility] felt that he had a change in mental status.  [facility] sent the patient with change in mental status and hypotension.  Patient has had to take ProAmatine for hypotension and the past.  Not sure when the last add ProAmatine.  See home list of medications which was reviewed.  Patient has inability to swallow his medications at this time.    * No surgery found *      Hospital Course:   ICU dobutamine drip titrated as needed, Lasix and may need to add Levophed if the patient's map cannot stay above 60.  Family is flying in tomorrow.  Will not intubated unless absolutely necessary.  Twelve weeks ago the patient apparently revoked his DNR.  The patient does not wish to be on a ventilator.  Surgical consultation for PEG tube since the patient has failed his dysphagia test.   consult.  Dietary consult.  The patient does have decubiti  pictures have been made.  Patient is very cachectic and has significant malnutrition.  Patient also has chronic kidney disease. Chronic kidney disease stage 3.    06/24/2019:  Patient remains on dopamine and Levophed for pressure support.  Patient can answer questions directly but. Only one word answers in a delayed fashion.  Blood pressure is improving with a blood pressure now running 110  over 60 range.  Patient will also be seen by surgery today for questionable gastric tube placement for nutrition however when family members arrive this discussion will be undertaken to determine if that is and avenues they would like to pursue.    06/24/2019:  Interim events:  Patient began to have respiratory difficulty tonight and heart rate decreased to the 40s.  Patient was given 0.56 mg of atropine and discussion with this patient's son who is power-of- stated that at this point he wanted intubation and life saving measures.  This was exercise patient was intubated with 8.0 mm ET tube and triple-lumen catheter was placed on right IJ see procedure notes.  Patient is stable and ABG just after intubation shows a pH of 7.32 pCO2 of 34 PO2 of 386 and O2 sats are 100%.  This is on a tidal volume of 450 rate of 12 and FiO2 100%.  Based on these findings the FiO2 was decreased to 50%.  ABGs will be done every 6 hr and I had a long discussion with the son who is power-of- by phone after procedures were done and he will be here in the morning.  I made him aware that his prognosis may still be poor due to his malnutrition and congestive heart failure.  However urine output has been only 30 cc over the past few hours and patient will be given fluid gently to determine his renal function.  Monitoring at all times respiratory status.  Patient now is stable on mechanical ventilation.  10:30 p.m.    06/25/2019:  Patient is stable on mechanical ventilation..  Chest x-ray this morning revealed increased interstitial markings with cephalization concerning for increased pulmonary edema or congestive heart failure.  No new infiltrates noted. Patient remains on 2 pressors of dopamine and Levophed.  Heart rate and vital signs have been stable with mean arterial pressure at 68 labs do show increase in BUN at 60 and creatinine 2.3 his initially the same as yesterday.  Patient will continue current level of care will  increased diuresis.  All other labs were fully reviewed by patient.    06/25/2019:  The patient family and power of  have decided to go with [facility].  However they would like everything continued until midnight and then begin gradual withdrawal of treatment after midnight.  Family is all in understanding of this and desire to proceed.  Therefore I will discharge this patient from my service and will readmit to [facility] as an inpatient.  All medications the patient is currently getting will be continued with hospice as appropriate.     Consults:   Consults (From admission, onward)        Status Ordering Provider     Inpatient consult to Case Management  Once     Provider:  (Not yet assigned)    Acknowledged SOMMER VIZCARRA     Inpatient consult to Dietitian  Once     Provider:  (Not yet assigned)    Acknowledged RODOLFO PERRY     Inpatient consult to General Surgery  Once     Provider:  Timoteo Sullivan MD    Completed RODOLFO PERRY     Inpatient consult to   Once     Provider:  (Not yet assigned)    Acknowledged RODOLFO PERRY.          * Acute congestive heart failure        RA (acute kidney injury)    06/25/2019:  1.  Stop IV fluids  2.  Continue diuresis aggressively today with replacement of electrolytes  4.  Repeat labs and chest x-ray in the morning include CBC CMP and ABG.  5.  Full discussion with son, power-of-, will be undertaken as soon as he arrives.  Any other change in care will be based on this meeting.    06/25/2019:  1.  Will discharge this patient from my service however will readmit to [facility] under general inpatient order for hospice care P  2.  Will begin withdrawing treatment as appropriate at midnight tonight.  Patient otherwise will be under the care of hospice which will organized and execute the withdrawal process  3.  We will follow otherwise as needed.    Acquired  hypothyroidism        Aspiration pneumonia  Patient was intubated and sedated last evening.  This morning blood pressure appears mildly increased will attempt to wean pressors today.  Full discussion with power of , his son will be undertaken as soon as possible  Repeat labs in the morning to include CBC CMP ABG and chest x-ray        Final Active Diagnoses:    Diagnosis Date Noted POA    PRINCIPAL PROBLEM:  Acute congestive heart failure [I50.9] 06/23/2019 Yes    RA (acute kidney injury) [N17.9] 06/24/2019 Yes    Cachexia [R64]  Unknown    Aspiration pneumonia [J69.0] 06/23/2019 Yes    Severe malnutrition [E43] 06/23/2019 Yes    Acquired hypothyroidism [E03.9] 06/23/2019 Yes      Problems Resolved During this Admission:    Diagnosis Date Noted Date Resolved POA    Sepsis [A41.9] 06/23/2019 06/23/2019 Yes       Discharged Condition: good    Disposition:     Follow Up:    Patient Instructions:   No discharge procedures on file.    Significant Diagnostic Studies: Labs: All labs within the past 24 hours have been reviewed    Pending Diagnostic Studies:     None         Medications:  Reconciled Home Medications:      Medication List      STOP taking these medications    albuterol-ipratropium 2.5 mg-0.5 mg/3 mL nebulizer solution  Commonly known as:  DUO-NEB     amiodarone 200 MG Tab  Commonly known as:  PACERONE     apixaban 2.5 mg Tab  Commonly known as:  ELIQUIS     carvedilol 3.125 MG tablet  Commonly known as:  COREG     cyproheptadine 4 mg tablet  Commonly known as:  PERIACTIN     DECUBI SHANDRA ORAL     diphenhydrAMINE-aluminum-magnesium hydroxide-simethicone-lidocaine HCl 2%     escitalopram oxalate 10 MG tablet  Commonly known as:  LEXAPRO     famotidine 10 MG tablet  Commonly known as:  PEPCID     ferrous gluconate 324 mg (37.5 mg iron) Tab tablet     furosemide 40 MG tablet  Commonly known as:  LASIX     hydrOXYzine pamoate 25 MG Cap  Commonly known as:  VISTARIL     levoFLOXacin 750 MG  tablet  Commonly known as:  LEVAQUIN     lisinopril 5 MG tablet  Commonly known as:  PRINIVIL,ZESTRIL     methocarbamol 500 MG Tab  Commonly known as:  ROBAXIN     midodrine 2.5 MG Tab  Commonly known as:  PROAMATINE     MIRALAX 17 gram/dose powder  Generic drug:  polyethylene glycol     ondansetron 4 MG tablet  Commonly known as:  ZOFRAN     pantoprazole 40 MG tablet  Commonly known as:  PROTONIX     spironolactone 25 MG tablet  Commonly known as:  ALDACTONE            Indwelling Lines/Drains at time of discharge:   Lines/Drains/Airways     Central Venous Catheter Line                 Percutaneous Central Line Insertion/Assessment - triple lumen  06/24/19 2130 right internal jugular less than 1 day          Drain                 NG/OG Tube 06/25/19 0930 Heard sump 18 Fr. Left mouth less than 1 day         Urethral Catheter 06/24/19 2200 Non-latex 16 Fr. less than 1 day          Airway                 Airway - Non-Surgical 06/24/19 2043 Endotracheal Tube less than 1 day          Pressure Ulcer                 Pressure Injury 06/23/19 1732 Right anterior Hip #2 Stage 2 1 day         Pressure Injury 06/24/19 1726 lower Coccyx less than 1 day                Time spent on the discharge of patient: 60 minutes  Patient was seen and examined on the date of discharge and determined to be suitable for discharge.         Everardo Ingram MD  Department of Hospital Medicine  [facility] - ICU

## 2019-06-25 NOTE — PLAN OF CARE
Problem: Communication Impairment (Mechanical Ventilation, Invasive)  Goal: Effective Communication  Outcome: Ongoing (interventions implemented as appropriate)  Continue to monitor

## 2019-06-25 NOTE — PROCEDURES
"Eleazar Ellis is a 86 y.o. male patient.    Temp: 99.1 °F (37.3 °C) (06/24/19 1915)  Pulse: 95 (06/24/19 2203)  Resp: 15 (06/24/19 2203)  BP: (!) 111/95 (06/24/19 2203)  SpO2: 100 % (06/24/19 2203)  Weight: 52.2 kg (115 lb 1.3 oz) (06/23/19 1645)  Height: 6' (182.9 cm) (06/23/19 1900)       Intubation  Date/Time: 6/24/2019 10:12 PM  Location procedure was performed: [facility] ICU  Performed by: Everardo Ingram MD  Authorized by: Everardo Ingram MD   Pre-operative diagnosis: respiratory failure  Post-operative diagnosis: same  Consent Done: Yes  Consent: Verbal consent obtained.  Consent given by: power of   Patient understanding: patient states understanding of the procedure being performed  Patient consent: the patient's understanding of the procedure matches consent given  Procedure consent: procedure consent matches procedure scheduled  Relevant documents: relevant documents present and verified  Test results: test results available and properly labeled  Site marked: the operative site was marked  Imaging studies: imaging studies available  Time out: Immediately prior to procedure a "time out" was called to verify the correct patient, procedure, equipment, support staff and site/side marked as required.  Indications: respiratory distress,  respiratory failure and  hypoxemia  Intubation method: oral  Patient status: sedated  Preoxygenation: BVM  Pretreatment medications: midazolam  Paralytic: none  Laryngoscope size: Mac 4  Tube size: 8.0 mm  Tube type: cuffed  Number of attempts: 2  Ventilation between attempts: BVM  Cricoid pressure: no  Cords visualized: yes  Post-procedure assessment: chest rise and CO2 detector  Breath sounds: rales/crackles  Cuff inflated: yes  ETT to lip: 25 cm  ETT to teeth: 24 cm  Tube secured with: ETT russell  Chest x-ray interpreted by me.  Chest x-ray findings: endotracheal tube in appropriate position  Patient tolerance: Patient tolerated the procedure well with no immediate " complications  Complications: No  Specimens: No  Implants: No          Everardo Ingram  6/24/2019

## 2019-06-25 NOTE — PROGRESS NOTES
Versed 5 mg IV given and once effect is noted patient intubated by Dr. Ingram with a #8 OETT @ 24 at the teeth.  Placement is confirmed by auscultation and Co2 detector.  Will do PCXR once central line placed.  Patient lips are pink now but nailbeds remain dusky.

## 2019-06-25 NOTE — PLAN OF CARE
06/25/19 1502   Post-Acute Status   Post-Acute Authorization Home Health/Hospice   Home Health/Hospice Status Set-up Complete   Papers for [facility] signed by Dr Ingram. Tae Ellis & his brother left. States his sister will be here this evening. They don't want him taken off the ventilator until after midnight as they don't want him to die on his nephew's birthday. Will keep him updated of patient's progress.

## 2019-06-25 NOTE — PROGRESS NOTES
Notified Dr. Ingram of low UOP the last two hours.  Also updated on drip rates for IVF, dobutamine and levophed.  Reported latest ABG results and increase in rate on ventilator.  Dr. Ingram wants to see the CXR this am before giving any more boluses.  Order received to just monitor patient at this time.

## 2019-06-25 NOTE — PROCEDURES
"Eleazar Ellis is a 86 y.o. male patient.    Temp: 99.1 °F (37.3 °C) (06/24/19 1915)  Pulse: 95 (06/24/19 2203)  Resp: 15 (06/24/19 2203)  BP: (!) 111/95 (06/24/19 2203)  SpO2: 100 % (06/24/19 2203)  Weight: 52.2 kg (115 lb 1.3 oz) (06/23/19 1645)  Height: 6' (182.9 cm) (06/23/19 1900)       Central Line  Date/Time: 6/24/2019 10:24 PM  Location procedure was performed: [facility] ICU  Performed by: Everardo Ingram MD  Pre-operative Diagnosis: iv access  Consent Done: Yes  Time out: Immediately prior to procedure a "time out" was called to verify the correct patient, procedure, equipment, support staff and site/side marked as required.  Indications: med administration and vascular access  Anesthesia: local infiltration    Anesthesia:  Local Anesthetic: lidocaine 1% without epinephrine  Preparation: skin prepped with ChloraPrep  Skin prep agent dried: skin prep agent completely dried prior to procedure  Sterile barriers: all five maximum sterile barriers used - cap, mask, sterile gown, sterile gloves, and large sterile sheet  Hand hygiene: hand hygiene performed prior to central venous catheter insertion  Location details: right internal jugular  Catheter type: triple lumen  Catheter size: 6 Fr  Ultrasound guidance: no  Manometry: No   Number of attempts: 1  Assessment: placement verified by x-ray and no pneumothorax on x-ray  Complications: none  Specimens: No  Implants: No  Post-procedure: line sutured,  chlorhexidine patch,  sterile dressing applied and blood return through all ports  Complications: No          Everardo Ingram  6/24/2019  "

## 2019-06-25 NOTE — PLAN OF CARE
Problem: Adult Inpatient Plan of Care  Goal: Readiness for Transition of Care    Intervention: Mutually Develop Transition Plan     06/24/19 0915 06/25/19 1343   OTHER   Is patient able to care for self after discharge? No  --    Who are your caregiver(s) and their phone number(s)? [facility] [phone]  --    Patient currently receives home health services?  --  No   (RETIRED) Discharge Needs Assessment   How many people do you have in your home that can help with your care after discharge?  --  >4   Living Environment   Able to Return to Prior Arrangements yes  --    Discharge Needs Assessment   Readmission Within the Last 30 Days no previous admission in last 30 days  --    Transportation Anticipated agency  --    (RETIRED) Social Work Plan   Patient's perception of discharge disposition nursing home  --

## 2019-06-25 NOTE — PLAN OF CARE
06/25/19 1300   Discharge Reassessment   Assessment Type Discharge Planning Reassessment   Erlinda from [facility] in to see patient's family.

## 2019-06-25 NOTE — ASSESSMENT & PLAN NOTE
06/25/2019:  1.  Stop IV fluids  2.  Continue diuresis aggressively today with replacement of electrolytes  4.  Repeat labs and chest x-ray in the morning include CBC CMP and ABG.  5.  Full discussion with son, power-of-, will be undertaken as soon as he arrives.  Any other change in care will be based on this meeting.    06/25/2019:  1.  Will discharge this patient from my service however will readmit to [facility] hospice under general inpatient order for hospice care P  2.  Will begin withdrawing treatment as appropriate at midnight tonight.  Patient otherwise will be under the care of hospice which will organized and execute the withdrawal process  3.  We will follow otherwise as needed.

## 2019-06-25 NOTE — PROGRESS NOTES
Patient HR dropped to 49.  Patient is only responsive to pain and has very shallow respirations.  Call placed to Dr. Wu to notify him of drop in HR and level of responsiveness.  Dr. Ingram states he is on his way to the hospital.  Respiratory is at bedside and is ambuing patient at this time.  Levophed gtt is being titrated to increase BP and HR.  House Supervisor at bedside as well.

## 2019-06-25 NOTE — PROGRESS NOTES
Central line placed to RIJ per Dr. Ingram without problems under sterile technique.  Blood return noted from all three ports.  Sterile dressing applied by Dr. Ingram and PCXR done to verify placement.

## 2019-06-25 NOTE — ASSESSMENT & PLAN NOTE
Patient was intubated and sedated last evening.  This morning blood pressure appears mildly increased will attempt to wean pressors today.  Full discussion with power of , his son will be undertaken as soon as possible  Repeat labs in the morning to include CBC CMP ABG and chest x-ray

## 2019-06-25 NOTE — PROGRESS NOTES
Upon assessing patient found him to be verbally responsive to stimuli but it is generally grunts or indecipherable words.  He does track this staff member with eyes around the room.  Fingernails and lips are dusky.  Spo2 waveform is irregular and poor, changed site multiple times without improvement in waveform or numbers.  Unable to trust waveform.  Will monitor closely.

## 2019-06-25 NOTE — PLAN OF CARE
Problem: Adjustment to Illness (Heart Failure)  Goal: Optimal Coping  Outcome: Ongoing (interventions implemented as appropriate)  Continue to monitor

## 2019-06-25 NOTE — PLAN OF CARE
06/25/19 1130   Discharge Reassessment   Assessment Type Discharge Planning Reassessment   Anticipated Discharge Disposition [facility]ospiceMedic   Discharge Plan A Inpatient Hospice   DME Needed Upon Discharge  none   Patient choice form signed by patient/caregiver Yes   Post-Acute Status   Post-Acute Authorization Home Health/Hospice   Home Health/Hospice Status Referrals Sent   Patient's sons & brother in room. Tae Ellis son has POA. States the doctor has been in & they are waiting for their sister to come visit tonight. They would like to have hospice come talk to them as they would like to keep everything going like it is right now but once their sister is finished visiting would like him placed on hospice. Preference form signed for [facility] & notified to come see patient's family around 1:00. Will continue to follow.

## 2019-06-25 NOTE — PLAN OF CARE
06/25/19 1503   Final Note   Assessment Type Final Discharge Note   Anticipated Discharge Disposition [facility]ospice[facility]   What phone number can be called within the next 1-3 days to see how you are doing after discharge? [phone]   Papers for [facility] signed by Dr Ingram. Tae Ellis & his brother left. States his sister will be here this evening. They don't want him taken off the ventilator until after midnight as they don't want him to die on his nephew's birthday. Will keep him updated of patient's progress.

## 2019-06-25 NOTE — PROGRESS NOTES
Call placed to son Tae whom lives in [place].  Spoke with Tae regarding his fathers status and his decline in heart rate and BP and respiratory status.  Spoke about specifics in regards to end of life decisions.  After a detailed conversation it was determined that Tae felt his dad would want everything done at this point to save his life.  Patient is a full code including intubation.  Phone call was ended in order to care for patient.  Dr. Ingram notified of conversation and states he is already en route to hospital to intubate patient.

## 2019-06-26 PROBLEM — R09.89 RESPIRATORY COMPROMISE: Status: ACTIVE | Noted: 2019-01-01

## 2019-06-26 NOTE — NURSING
Plan of care and dying process discussed at length with patient's daughter. Voiced understanding. Pt's daughter consented to visit by . Will notify Father Gallo when he comes to visit another patient tonight.

## 2019-06-26 NOTE — PLAN OF CARE
Problem: End-of-Life Care  Goal: Comfort, Peace and Preserved Dignity  Outcome: Ongoing (interventions implemented as appropriate)  Plan is to withdraw care today when family arrives. Hospice nurse, Maria Esther, will be present.

## 2019-06-26 NOTE — SUBJECTIVE & OBJECTIVE
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        Past Surgical History:   Procedure Laterality Date    ANKLE SURGERY Left        Review of patient's allergies indicates:  No Known Allergies    Current Facility-Administered Medications on File Prior to Encounter   Medication    [COMPLETED] furosemide injection 40 mg    [DISCONTINUED] 0.9%  NaCl infusion    [DISCONTINUED] albuterol-ipratropium 2.5 mg-0.5 mg/3 mL nebulizer solution 3 mL    [DISCONTINUED] azithromycin 500 mg in dextrose 5 % 250 mL IVPB (ready to mix system)    [DISCONTINUED] cefTRIAXone (ROCEPHIN) 1 g in dextrose 5 % 50 mL IVPB    [DISCONTINUED] DOBUTamine 500mg in D5W 250mL infusion (premix)    [DISCONTINUED] enoxaparin injection 30 mg    [DISCONTINUED] levothyroxine injection 100 mcg    [DISCONTINUED] NORepinephrine bitartrate 8 mg in dextrose 5% 250 mL infusion    [DISCONTINUED] pantoprazole injection 40 mg     No current outpatient medications on file prior to encounter.     Family History     None        Tobacco Use    Smoking status: Never Smoker   Substance and Sexual Activity    Alcohol use: No     Frequency: Never    Drug use: No    Sexual activity: Never     Review of Systems   Unable to perform ROS: Intubated     Objective:     Vital Signs (Most Recent):  Temp: (!) 95.9 °F (35.5 °C) (06/26/19 0600)  Pulse: 87 (06/26/19 0600)  Resp: 16 (06/26/19 0600)  BP: 97/71 (06/26/19 0600)  SpO2: 100 % (06/26/19 0600) Vital Signs (24h Range):  Temp:  [95.9 °F (35.5 °C)-98.8 °F (37.1 °C)] 95.9 °F (35.5 °C)  Pulse:  [71-89] 87  Resp:  [12-20] 16  SpO2:  [81 %-100  %] 100 %  BP: ()/(56-77) 97/71     Weight: 52.5 kg (115 lb 11.9 oz)  Body mass index is 15.7 kg/m².    Physical Exam   Constitutional: He is oriented to person, place, and time. He appears well-developed and well-nourished.   Very poor constitutionally.   HENT:   Head: Normocephalic and atraumatic.   Right Ear: External ear normal.   Left Ear: External ear normal.   Nose: Nose normal.   Intubated at this time.   Eyes: Pupils are equal, round, and reactive to light. Conjunctivae, EOM and lids are normal.   Neck: Trachea normal, normal range of motion and full passive range of motion without pain. Neck supple.   Cardiovascular: Normal rate, regular rhythm, S1 normal, S2 normal, normal heart sounds, intact distal pulses and normal pulses.   Pulmonary/Chest: Effort normal. He has wheezes. He has rales.   Abdominal: Soft. Normal aorta and bowel sounds are normal.   Very scaphoid due to weight loss.   Musculoskeletal: Normal range of motion.   Patient weighs 45 kg is extremely emaciated cachectic   Neurological: He is alert and oriented to person, place, and time. He has normal reflexes.   Intubated at this time.   Skin: Skin is warm, dry and intact.   Decubiti I right hip sacral   Psychiatric: He has a normal mood and affect. His speech is normal and behavior is normal. Judgment and thought content normal. Cognition and memory are normal.   Intubated at this time.   Nursing note and vitals reviewed.        CRANIAL NERVES     CN III, IV, VI   Pupils are equal, round, and reactive to light.  Extraocular motions are normal.        Significant Labs:   BMP:   Recent Labs   Lab 06/24/19 2101 06/25/19  0505   * 188*    136   K 4.6 4.0    105   CO2 21* 21*   BUN 62* 63*   CREATININE 2.7* 2.6*   CALCIUM 8.4* 7.8*   MG 2.0  --      CBC:   Recent Labs   Lab 06/24/19 2101 06/25/19  0505   WBC 11.40 13.19*   HGB 11.8* 11.9*   HCT 38.3* 38.1*    236     CMP:   Recent Labs   Lab 06/24/19 2101  06/25/19  0505    136   K 4.6 4.0    105   CO2 21* 21*   * 188*   BUN 62* 63*   CREATININE 2.7* 2.6*   CALCIUM 8.4* 7.8*   PROT 5.9*  --    ALBUMIN 2.5*  --    BILITOT 1.4*  --    ALKPHOS 74  --    *  --    ALT 53*  --    ANIONGAP 10 10   EGFRNONAA 20.4* 21.4*     All pertinent labs within the past 24 hours have been reviewed.    Significant Imaging: I have reviewed and interpreted all pertinent imaging results/findings within the past 24 hours.

## 2019-06-26 NOTE — PLAN OF CARE
Problem: Adult Inpatient Plan of Care  Goal: Plan of Care Review  Outcome: Ongoing (interventions implemented as appropriate)  Patient stable overnight on hospice care.  POC includes weaning vasopressors and inotropes today in lieu of removing ventilator once family is all present.  BP only monitored hourly due to status of hospice.  Norepinephrine and dobutamine left at the rate they were running when hospice care began.  Bathed and linen change done this am in preparation for the day.  Patient is a  and plans are already in process for red, white and blue at death.  Family will be here this am as will the hospice nurse.  Skin is unchanged.  No falls or injuries this shift.

## 2019-06-26 NOTE — H&P
[facility] - ICU Hospital Medicine  History & Physical    Patient Name: Eleazar Ellis  MRN: [id number]  Admission Date: 6/25/2019  Attending Physician: Dilan Whelan MD   Primary Care Provider: [facility]         Patient information was obtained from patient, relative(s), nursing home and ER records.     Subjective:     Principal Problem:Hospice care    Chief Complaint: No chief complaint on file.       HPI: Patient was admitted initially from [facility].  He had multiple compromising factors including severe malnutrition decubiti on the right hip sacrum.  Patient with aspiration pneumonia inability to swallow any medication pills.  Patient made himself a full code about 12 weeks ago.  After multiple family conversations the patient has been placed on hospice.  He was intubated yesterday we do not wish to intubate him at this time until additional family has been able to see the patient.    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        Past Surgical History:   Procedure Laterality Date    ANKLE SURGERY Left        Review of patient's allergies indicates:  No Known Allergies    Current Facility-Administered Medications on File Prior to Encounter   Medication    [COMPLETED] furosemide injection 40 mg    [DISCONTINUED] 0.9%  NaCl infusion    [DISCONTINUED] albuterol-ipratropium 2.5 mg-0.5 mg/3 mL nebulizer solution 3 mL    [DISCONTINUED] azithromycin 500 mg in dextrose 5 % 250 mL IVPB (ready to mix system)    [DISCONTINUED] cefTRIAXone (ROCEPHIN) 1 g in dextrose 5 % 50 mL IVPB     [DISCONTINUED] DOBUTamine 500mg in D5W 250mL infusion (premix)    [DISCONTINUED] enoxaparin injection 30 mg    [DISCONTINUED] levothyroxine injection 100 mcg    [DISCONTINUED] NORepinephrine bitartrate 8 mg in dextrose 5% 250 mL infusion    [DISCONTINUED] pantoprazole injection 40 mg     No current outpatient medications on file prior to encounter.     Family History     None        Tobacco Use    Smoking status: Never Smoker   Substance and Sexual Activity    Alcohol use: No     Frequency: Never    Drug use: No    Sexual activity: Never     Review of Systems   Unable to perform ROS: Intubated     Objective:     Vital Signs (Most Recent):  Temp: (!) 95.9 °F (35.5 °C) (06/26/19 0600)  Pulse: 87 (06/26/19 0600)  Resp: 16 (06/26/19 0600)  BP: 97/71 (06/26/19 0600)  SpO2: 100 % (06/26/19 0600) Vital Signs (24h Range):  Temp:  [95.9 °F (35.5 °C)-98.8 °F (37.1 °C)] 95.9 °F (35.5 °C)  Pulse:  [71-89] 87  Resp:  [12-20] 16  SpO2:  [81 %-100 %] 100 %  BP: ()/(56-77) 97/71     Weight: 52.5 kg (115 lb 11.9 oz)  Body mass index is 15.7 kg/m².    Physical Exam   Constitutional: He is oriented to person, place, and time. He appears well-developed and well-nourished.   Very poor constitutionally.   HENT:   Head: Normocephalic and atraumatic.   Right Ear: External ear normal.   Left Ear: External ear normal.   Nose: Nose normal.   Intubated at this time.   Eyes: Pupils are equal, round, and reactive to light. Conjunctivae, EOM and lids are normal.   Neck: Trachea normal, normal range of motion and full passive range of motion without pain. Neck supple.   Cardiovascular: Normal rate, regular rhythm, S1 normal, S2 normal, normal heart sounds, intact distal pulses and normal pulses.   Pulmonary/Chest: Effort normal. He has wheezes. He has rales.   Abdominal: Soft. Normal aorta and bowel sounds are normal.   Very scaphoid due to weight loss.   Musculoskeletal: Normal range of motion.   Patient weighs 45 kg is extremely  emaciated cachectic   Neurological: He is alert and oriented to person, place, and time. He has normal reflexes.   Intubated at this time.   Skin: Skin is warm, dry and intact.   Decubiti I right hip sacral   Psychiatric: He has a normal mood and affect. His speech is normal and behavior is normal. Judgment and thought content normal. Cognition and memory are normal.   Intubated at this time.   Nursing note and vitals reviewed.        CRANIAL NERVES     CN III, IV, VI   Pupils are equal, round, and reactive to light.  Extraocular motions are normal.        Significant Labs:   BMP:   Recent Labs   Lab 06/24/19 2101 06/25/19  0505   * 188*    136   K 4.6 4.0    105   CO2 21* 21*   BUN 62* 63*   CREATININE 2.7* 2.6*   CALCIUM 8.4* 7.8*   MG 2.0  --      CBC:   Recent Labs   Lab 06/24/19 2101 06/25/19  0505   WBC 11.40 13.19*   HGB 11.8* 11.9*   HCT 38.3* 38.1*    236     CMP:   Recent Labs   Lab 06/24/19 2101 06/25/19  0505    136   K 4.6 4.0    105   CO2 21* 21*   * 188*   BUN 62* 63*   CREATININE 2.7* 2.6*   CALCIUM 8.4* 7.8*   PROT 5.9*  --    ALBUMIN 2.5*  --    BILITOT 1.4*  --    ALKPHOS 74  --    *  --    ALT 53*  --    ANIONGAP 10 10   EGFRNONAA 20.4* 21.4*     All pertinent labs within the past 24 hours have been reviewed.    Significant Imaging: I have reviewed and interpreted all pertinent imaging results/findings within the past 24 hours.    Assessment/Plan:     No notes have been filed under this hospital service.  Service: Hospital Medicine    VTE Risk Mitigation (From admission, onward)    None             Dilan Whelan MD  Department of Hospital Medicine   [facility] - ICU

## 2019-06-26 NOTE — HPI
Patient was admitted initially from [facility].  He had multiple compromising factors including severe malnutrition decubiti on the right hip sacrum.  Patient with aspiration pneumonia inability to swallow any medication pills.  Patient made himself a full code about 12 weeks ago.  After multiple family conversations the patient has been placed on hospice.  He was intubated yesterday we do not wish to intubate him at this time until additional family has been able to see the patient.

## 2019-06-27 NOTE — NURSING
Pt passed 0905. Laurel at bedside, emotional support provided. House supervisor notified. VALENCIA Corona hospice RN notified. Family at bedside, emotional support provided.

## 2019-06-27 NOTE — DISCHARGE SUMMARY
[facility] - Med Surg Hospital Medicine  Discharge Summary      Patient Name: Eleazar Ellis  MRN: [id number]  Admission Date: 2019  Hospital Length of Stay: 2 days  Discharge Date and Time:  2019 1:37 AM  Attending Physician: Dilan Whelan MD   Discharging Provider: Dilan Whelan MD  Primary Care Provider: [facility]      HPI:   Patient was admitted initially from [facility].  He had multiple compromising factors including severe malnutrition decubiti on the right hip sacrum.  Patient with aspiration pneumonia inability to swallow any medication pills.  Patient made himself a full code about 12 weeks ago.  After multiple family conversations the patient has been placed on hospice.  He was intubated yesterday we do not wish to intubate him at this time until additional family has been able to see the patient.    * No surgery found *      Hospital Course:   Hospice care extubation today.  Hospice death     Consults:     * Hospice care  Hospice death        Final Active Diagnoses:    Diagnosis Date Noted POA    PRINCIPAL PROBLEM:  Hospice care [Z51.5] 2019 Not Applicable    Respiratory compromise [J98.9] 2019 Yes    Decubitus ulcer [L89.90] 2019 Yes      Problems Resolved During this Admission:       Discharged Condition:     Disposition:     Follow Up:    Patient Instructions:   No discharge procedures on file.    Significant Diagnostic Studies: Labs: none    Pending Diagnostic Studies:     None         Medications:  None (patient  at medical facility)    Indwelling Lines/Drains at time of discharge:   Lines/Drains/Airways     Central Venous Catheter Line                 Tunneled Central Line Insertion/Assessment - Triple Lumen  right internal jugular -- days          Drain                 Urethral Catheter 19 2200 Non-latex 16 Fr. 2 days          Pressure Ulcer                 Pressure Injury 19 3108  Right anterior Hip #2 Stage 2 3 days         Pressure Injury 06/24/19 1726 lower Coccyx 2 days                Time spent on the discharge of patient: 35 minutes  Patient was seen and examined on the date of discharge and determined to be suitable for discharge.     cause of death aspiration pneumonia, cardiopulmonary failure severe malnutrition    Dilan Whelan MD  Department of Hospital Medicine  [facility] - Med Surg

## 2019-06-27 NOTE — NURSING
Pts condition declining , Laurel, pts daughter at bedside. Discussed with her pts  empending death . Verbalized understanding. Emotional support provided.

## 2019-06-27 NOTE — NURSING
Post partum care provided. [facility]  home notified per VALENCIA Corona RN. Family at bedside, emotional support provided.

## 2019-06-28 LAB — BACTERIA BLD CULT: NORMAL
